# Patient Record
Sex: FEMALE | Race: WHITE | Employment: FULL TIME | URBAN - METROPOLITAN AREA
[De-identification: names, ages, dates, MRNs, and addresses within clinical notes are randomized per-mention and may not be internally consistent; named-entity substitution may affect disease eponyms.]

---

## 2017-01-27 ENCOUNTER — TELEPHONE (OUTPATIENT)
Dept: FAMILY MEDICINE | Facility: CLINIC | Age: 27
End: 2017-01-27

## 2017-01-27 DIAGNOSIS — F32.1 MAJOR DEPRESSIVE DISORDER, SINGLE EPISODE, MODERATE (H): Primary | ICD-10-CM

## 2017-01-27 NOTE — TELEPHONE ENCOUNTER
citalopram     Last Written Prescription Date: 07/147/16  Last Fill Quantity: 90, # refills: 1  Last Office Visit with [facility] [place] care provider:  04/28/16 Dr. Iraheta    Next 5 appointments (look out 90 days)     Feb 01, 2017 11:00 AM   [facility]    [address] [place] [id number] [phone]                   Last PHQ-9 score on record=   PHQ-9 SCORE 4/28/2016   Total Score -   Total Score [facility] -   Total Score 2

## 2017-01-31 RX ORDER — CITALOPRAM HYDROBROMIDE 40 MG/1
TABLET ORAL
Qty: 30 TABLET | Refills: 0 | Status: SHIPPED | OUTPATIENT
Start: 2017-01-31 | End: 2017-02-01

## 2017-01-31 NOTE — TELEPHONE ENCOUNTER
Routing refill request to provider for review/approval because:  Ayanna given x1 and patient did not follow up, please advise  Janessa Salazar RN

## 2017-02-01 ENCOUNTER — OFFICE VISIT (OUTPATIENT)
Dept: FAMILY MEDICINE | Facility: CLINIC | Age: 27
End: 2017-02-01
Payer: COMMERCIAL

## 2017-02-01 VITALS
HEIGHT: 66 IN | WEIGHT: 180.8 LBS | OXYGEN SATURATION: 97 % | SYSTOLIC BLOOD PRESSURE: 126 MMHG | HEART RATE: 90 BPM | BODY MASS INDEX: 29.06 KG/M2 | RESPIRATION RATE: 16 BRPM | TEMPERATURE: 98.4 F | DIASTOLIC BLOOD PRESSURE: 82 MMHG

## 2017-02-01 DIAGNOSIS — N91.5 OLIGOMENORRHEA: ICD-10-CM

## 2017-02-01 DIAGNOSIS — B36.0 TV (TINEA VERSICOLOR): ICD-10-CM

## 2017-02-01 DIAGNOSIS — F32.1 MAJOR DEPRESSIVE DISORDER, SINGLE EPISODE, MODERATE (H): ICD-10-CM

## 2017-02-01 DIAGNOSIS — F41.9 ANXIETY: ICD-10-CM

## 2017-02-01 DIAGNOSIS — Z00.00 ENCOUNTER FOR ROUTINE ADULT HEALTH EXAMINATION WITHOUT ABNORMAL FINDINGS: Primary | ICD-10-CM

## 2017-02-01 DIAGNOSIS — E55.9 VITAMIN D DEFICIENCY: ICD-10-CM

## 2017-02-01 LAB
BASOPHILS # BLD AUTO: 0 10E9/L (ref 0–0.2)
BASOPHILS NFR BLD AUTO: 0.6 %
CHOLEST SERPL-MCNC: 215 MG/DL
DIFFERENTIAL METHOD BLD: NORMAL
EOSINOPHIL # BLD AUTO: 0.3 10E9/L (ref 0–0.7)
EOSINOPHIL NFR BLD AUTO: 4.9 %
ERYTHROCYTE [DISTWIDTH] IN BLOOD BY AUTOMATED COUNT: 12.4 % (ref 10–15)
FERRITIN SERPL-MCNC: 137 NG/ML (ref 12–150)
GLUCOSE SERPL-MCNC: 94 MG/DL (ref 70–99)
HCT VFR BLD AUTO: 42.9 % (ref 35–47)
HDLC SERPL-MCNC: 50 MG/DL
HGB BLD-MCNC: 15.5 G/DL (ref 11.7–15.7)
LDLC SERPL CALC-MCNC: 143 MG/DL
LYMPHOCYTES # BLD AUTO: 1.9 10E9/L (ref 0.8–5.3)
LYMPHOCYTES NFR BLD AUTO: 30.4 %
MCH RBC QN AUTO: 30.8 PG (ref 26.5–33)
MCHC RBC AUTO-ENTMCNC: 36.1 G/DL (ref 31.5–36.5)
MCV RBC AUTO: 85 FL (ref 78–100)
MICRO REPORT STATUS: ABNORMAL
MONOCYTES # BLD AUTO: 0.5 10E9/L (ref 0–1.3)
MONOCYTES NFR BLD AUTO: 7.8 %
NEUTROPHILS # BLD AUTO: 3.5 10E9/L (ref 1.6–8.3)
NEUTROPHILS NFR BLD AUTO: 56.3 %
NONHDLC SERPL-MCNC: 165 MG/DL
PLATELET # BLD AUTO: 222 10E9/L (ref 150–450)
RBC # BLD AUTO: 5.04 10E12/L (ref 3.8–5.2)
SPECIMEN SOURCE: ABNORMAL
TRIGL SERPL-MCNC: 110 MG/DL
TSH SERPL DL<=0.005 MIU/L-ACNC: 2.5 MU/L (ref 0.4–4)
WBC # BLD AUTO: 6.3 10E9/L (ref 4–11)
WET PREP SPEC: ABNORMAL

## 2017-02-01 PROCEDURE — 84443 ASSAY THYROID STIM HORMONE: CPT | Performed by: FAMILY MEDICINE

## 2017-02-01 PROCEDURE — 87491 CHLMYD TRACH DNA AMP PROBE: CPT | Performed by: FAMILY MEDICINE

## 2017-02-01 PROCEDURE — 80061 LIPID PANEL: CPT | Performed by: FAMILY MEDICINE

## 2017-02-01 PROCEDURE — 87210 SMEAR WET MOUNT SALINE/INK: CPT | Performed by: FAMILY MEDICINE

## 2017-02-01 PROCEDURE — 82947 ASSAY GLUCOSE BLOOD QUANT: CPT | Performed by: FAMILY MEDICINE

## 2017-02-01 PROCEDURE — 87624 HPV HI-RISK TYP POOLED RSLT: CPT | Performed by: FAMILY MEDICINE

## 2017-02-01 PROCEDURE — 85025 COMPLETE CBC W/AUTO DIFF WBC: CPT | Performed by: FAMILY MEDICINE

## 2017-02-01 PROCEDURE — G0145 SCR C/V CYTO,THINLAYER,RESCR: HCPCS | Performed by: FAMILY MEDICINE

## 2017-02-01 PROCEDURE — 99395 PREV VISIT EST AGE 18-39: CPT | Performed by: FAMILY MEDICINE

## 2017-02-01 PROCEDURE — 87591 N.GONORRHOEAE DNA AMP PROB: CPT | Performed by: FAMILY MEDICINE

## 2017-02-01 PROCEDURE — 82728 ASSAY OF FERRITIN: CPT | Performed by: FAMILY MEDICINE

## 2017-02-01 PROCEDURE — 36415 COLL VENOUS BLD VENIPUNCTURE: CPT | Performed by: FAMILY MEDICINE

## 2017-02-01 RX ORDER — CITALOPRAM HYDROBROMIDE 40 MG/1
TABLET ORAL
Qty: 90 TABLET | Refills: 1 | Status: SHIPPED | OUTPATIENT
Start: 2017-02-01 | End: 2017-08-28

## 2017-02-01 RX ORDER — LEVONORGESTREL AND ETHINYL ESTRADIOL 0.15-0.03
1 KIT ORAL DAILY
Qty: 91 TABLET | Refills: 3 | Status: SHIPPED | OUTPATIENT
Start: 2017-02-01 | End: 2017-05-23

## 2017-02-01 RX ORDER — KETOCONAZOLE 20 MG/ML
SHAMPOO TOPICAL
Qty: 120 ML | Refills: 2 | Status: SHIPPED | OUTPATIENT
Start: 2017-02-01 | End: 2019-07-22

## 2017-02-01 ASSESSMENT — ANXIETY QUESTIONNAIRES
IF YOU CHECKED OFF ANY PROBLEMS ON THIS QUESTIONNAIRE, HOW DIFFICULT HAVE THESE PROBLEMS MADE IT FOR YOU TO DO YOUR WORK, TAKE CARE OF THINGS AT HOME, OR GET ALONG WITH OTHER PEOPLE: NOT DIFFICULT AT ALL
2. NOT BEING ABLE TO STOP OR CONTROL WORRYING: NOT AT ALL
7. FEELING AFRAID AS IF SOMETHING AWFUL MIGHT HAPPEN: NOT AT ALL
GAD7 TOTAL SCORE: 0
5. BEING SO RESTLESS THAT IT IS HARD TO SIT STILL: NOT AT ALL
6. BECOMING EASILY ANNOYED OR IRRITABLE: NOT AT ALL
3. WORRYING TOO MUCH ABOUT DIFFERENT THINGS: NOT AT ALL
1. FEELING NERVOUS, ANXIOUS, OR ON EDGE: NOT AT ALL

## 2017-02-01 ASSESSMENT — PAIN SCALES - GENERAL: PAINLEVEL: NO PAIN (0)

## 2017-02-01 ASSESSMENT — PATIENT HEALTH QUESTIONNAIRE - PHQ9: 5. POOR APPETITE OR OVEREATING: NOT AT ALL
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
## 2017-02-01 NOTE — MR AVS SNAPSHOT
After Visit Summary   2/1/2017    Carla Oneal    MRN: [id number]           Patient Information     Date Of Birth          1990        Visit Information        Provider Department      2/1/2017 11:00 AM Felicia Iraheta MD [facility]        Today's Diagnoses     Encounter for routine adult health examination without abnormal findings    -  1     Oligomenorrhea         Major depressive disorder, single episode, moderate (H)         Anxiety         Vitamin D deficiency         TV (tinea versicolor)           Care Instructions    Update me if no menses start within 7 days of stopping the medroxyprogesterone.    Start birth control pill while on your menstrual cycle  Schedule fasting labs when you are able  Start exercising in the morning, slowly work your way up to 5x a week.     Start taking pictures of moles and monitor for changes.    Follow up in 6 months, sooner if things are not going well.     Preventive Health Recommendations  Female Ages 26 - 39  Yearly exam:   See your health care provider every year in order to    Review health changes.     Discuss preventive care.      Review your medicines if you your doctor has prescribed any.    Until age 30: Get a Pap test every three years (more often if you have had an abnormal result).    After age 30: Talk to your doctor about whether you should have a Pap test every 3 years or have a Pap test with HPV screening every 5 years.   You do not need a Pap test if your uterus was removed (hysterectomy) and you have not had cancer.  You should be tested each year for STDs (sexually transmitted diseases), if you're at risk.   Talk to your provider about how often to have your cholesterol checked.  If you are at risk for diabetes, you should have a diabetes test (fasting glucose).  Shots: Get a flu shot each year. Get a tetanus shot every 10 years.   Nutrition:     Eat at least 5 servings of fruits and vegetables each  "day.    Eat whole-grain bread, whole-wheat pasta and brown rice instead of white grains and rice.    Talk to your provider about Calcium and Vitamin D.     Lifestyle    Exercise at least 150 minutes a week (30 minutes a day, 5 days of the week). This will help you control your weight and prevent disease.    Limit alcohol to one drink per day.    No smoking.     Wear sunscreen to prevent skin cancer.    See your dentist every six months for an exam and cleaning.            Follow-ups after your visit        Who to contact     If you have questions or need follow up information about today's clinic visit or your schedule please contact [facility] directly at [phone].  Normal or non-critical lab and imaging results will be communicated to you by MyChart, letter or phone within 4 business days after the clinic has received the results. If you do not hear from us within 7 days, please contact the clinic through [employer] or phone. If you have a critical or abnormal lab result, we will notify you by phone as soon as possible.  Submit refill requests through [employer] or call your pharmacy and they will forward the refill request to us. Please allow 3 business days for your refill to be completed.          Additional Information About Your Visit        [employer] Information     [employer] gives you secure access to your electronic health record. If you see a primary care provider, you can also send messages to your care team and make appointments. If you have questions, please call your primary care clinic.  If you do not have a primary care provider, please call [phone] and they will assist you.        Care EveryWhere ID     This is your Care EveryWhere ID. This could be used by other organizations to access your [facility] medical records  [id number]        Your Vitals Were     Pulse Temperature Respirations Height BMI (Body Mass Index) Pulse Oximetry    90 98.4  F (36.9  C) (Oral) 16 1.67 m (5' 5.75\") " 29.41 kg/m2 97%       Blood Pressure from Last 3 Encounters:   02/01/17 126/82   04/28/16 134/82   01/04/16 118/78    Weight from Last 3 Encounters:   02/01/17 82.01 kg (180 lb 12.8 oz)   04/28/16 88.089 kg (194 lb 3.2 oz)   01/04/16 83.915 kg (185 lb)              We Performed the Following     CBC with platelets differential     Chlamydia trachomatis PCR     Ferritin     Glucose     HPV High Risk Types DNA Cervical     Lipid panel reflex to direct LDL     Neisseria gonorrhoeae PCR     Pap imaged thin layer screen with HPV - recommended age 30 - 65 years (select HPV order below)     TSH with free T4 reflex     Wet prep          Today's Medication Changes          These changes are accurate as of: 2/1/17 12:06 PM.  If you have any questions, ask your nurse or doctor.               These medicines have changed or have updated prescriptions.        Dose/Directions    citalopram 40 MG tablet   Commonly known as:  celeXA   This may have changed:  See the new instructions.   Used for:  Major depressive disorder, single episode, moderate (H)   Changed by:  Felicia Iraheta MD        TAKE 1 TABLET (40 MG) BY MOUTH DAILY PATIENT NEEDS TO BE SEEN PRIOR TO FURTHER REFILLS.   Quantity:  90 tablet   Refills:  1            Where to get your medicines      These medications were sent to [facility]/pharmacy #[id number]  [facility] [address] [address]     Phone:  [phone]    - citalopram 40 MG tablet  - ketoconazole 2 % shampoo  - levonorgestrel-ethinyl estradiol 0.15-0.03 MG per tablet             Primary Care Provider Office Phone # Fax #    Felicia Iraheta [phone]       [address]  [facility] [address]        Thank you!     Thank you for choosing [facility]  for your care. Our goal is always to provide you with excellent care. Hearing back from our patients is one way we can continue to  improve our services. Please take a few minutes to complete the written survey that you may receive in the mail after your visit with us. Thank you!             Your Updated Medication List - Protect others around you: Learn how to safely use, store and throw away your medicines at www.disposemymeds.org.          This list is accurate as of: 2/1/17 12:06 PM.  Always use your most recent med list.                   Brand Name Dispense Instructions for use    ALEVE PO          citalopram 40 MG tablet    celeXA    90 tablet    TAKE 1 TABLET (40 MG) BY MOUTH DAILY PATIENT NEEDS TO BE SEEN PRIOR TO FURTHER REFILLS.       ketoconazole 2 % shampoo    NIZORAL    120 mL    Apply to the affected area and wash off after 5 minutes use daily for two weeks and then as needed       levonorgestrel-ethinyl estradiol 0.15-0.03 MG per tablet    SEASONALE    91 tablet    Take 1 tablet by mouth daily       VENTOLIN  (90 BASE) MCG/ACT Inhaler   Generic drug:  albuterol          vitamin D 2000 UNITS Caps      Take 1 capsule by mouth daily. Patient

## 2017-02-01 NOTE — NURSING NOTE
"Chief Complaint   Patient presents with     Physical     Recheck Medication       Initial /82 mmHg  Pulse 90  Temp(Src) 98.4  F (36.9  C) (Oral)  Resp 16  Ht 1.67 m (5' 5.75\")  Wt 82.01 kg (180 lb 12.8 oz)  BMI 29.41 kg/m2  SpO2 97% Estimated body mass index is 29.41 kg/(m^2) as calculated from the following:    Height as of this encounter: 1.67 m (5' 5.75\").    Weight as of this encounter: 82.01 kg (180 lb 12.8 oz).  BP completed using cuff size: carmenza Zaldivar MA      "

## 2017-02-01 NOTE — PATIENT INSTRUCTIONS
Update me if no menses start within 7 days of stopping the medroxyprogesterone.    Start birth control pill while on your menstrual cycle  Start exercising in the morning, slowly work your way up to 5x a week.     Start taking pictures of moles and monitor for changes.    Follow up in 6 months, sooner if things are not going well.     Preventive Health Recommendations  Female Ages 26 - 39  Yearly exam:   See your health care provider every year in order to    Review health changes.     Discuss preventive care.      Review your medicines if you your doctor has prescribed any.    Until age 30: Get a Pap test every three years (more often if you have had an abnormal result).    After age 30: Talk to your doctor about whether you should have a Pap test every 3 years or have a Pap test with HPV screening every 5 years.   You do not need a Pap test if your uterus was removed (hysterectomy) and you have not had cancer.  You should be tested each year for STDs (sexually transmitted diseases), if you're at risk.   Talk to your provider about how often to have your cholesterol checked.  If you are at risk for diabetes, you should have a diabetes test (fasting glucose).  Shots: Get a flu shot each year. Get a tetanus shot every 10 years.   Nutrition:     Eat at least 5 servings of fruits and vegetables each day.    Eat whole-grain bread, whole-wheat pasta and brown rice instead of white grains and rice.    Talk to your provider about Calcium and Vitamin D.     Lifestyle    Exercise at least 150 minutes a week (30 minutes a day, 5 days of the week). This will help you control your weight and prevent disease.    Limit alcohol to one drink per day.    No smoking.     Wear sunscreen to prevent skin cancer.    See your dentist every six months for an exam and cleaning.

## 2017-02-02 LAB
C TRACH DNA SPEC QL NAA+PROBE: NORMAL
N GONORRHOEA DNA SPEC QL NAA+PROBE: NORMAL
SPECIMEN SOURCE: NORMAL
SPECIMEN SOURCE: NORMAL

## 2017-02-02 ASSESSMENT — ANXIETY QUESTIONNAIRES: GAD7 TOTAL SCORE: 0

## 2017-02-02 ASSESSMENT — PATIENT HEALTH QUESTIONNAIRE - PHQ9: SUM OF ALL RESPONSES TO PHQ QUESTIONS 1-9: 4

## 2017-02-03 LAB
COPATH REPORT: NORMAL
PAP: NORMAL

## 2017-02-06 LAB
FINAL DIAGNOSIS: NORMAL
HPV HR 12 DNA CVX QL NAA+PROBE: NEGATIVE
HPV16 DNA SPEC QL NAA+PROBE: NEGATIVE
HPV18 DNA SPEC QL NAA+PROBE: NEGATIVE
SPECIMEN DESCRIPTION: NORMAL

## 2017-03-06 ENCOUNTER — OFFICE VISIT (OUTPATIENT)
Dept: URGENT CARE | Facility: URGENT CARE | Age: 27
End: 2017-03-06

## 2017-03-06 VITALS
DIASTOLIC BLOOD PRESSURE: 84 MMHG | OXYGEN SATURATION: 99 % | BODY MASS INDEX: 29.92 KG/M2 | HEART RATE: 90 BPM | TEMPERATURE: 98.3 F | SYSTOLIC BLOOD PRESSURE: 126 MMHG | WEIGHT: 184 LBS

## 2017-03-06 DIAGNOSIS — S05.01XA CORNEAL ABRASION, RIGHT, INITIAL ENCOUNTER: Primary | ICD-10-CM

## 2017-03-06 PROCEDURE — 99213 OFFICE O/P EST LOW 20 MIN: CPT | Performed by: PHYSICIAN ASSISTANT

## 2017-03-06 RX ORDER — CIPROFLOXACIN HYDROCHLORIDE 3.5 MG/ML
1 SOLUTION/ DROPS TOPICAL
Qty: 1 BOTTLE | Refills: 0 | Status: SHIPPED | OUTPATIENT
Start: 2017-03-06 | End: 2017-03-13

## 2017-03-06 NOTE — MR AVS SNAPSHOT
After Visit Summary   3/6/2017    Carla Oneal    MRN: [id number]           Patient Information     Date Of Birth          1990        Visit Information        Provider Department      3/6/2017 5:10 PM Radha Yan PA-C [facility]        Today's Diagnoses     Corneal abrasion, right, initial encounter    -  1       Follow-ups after your visit        Additional Services     OPTOMETRY REFERRAL       Your provider has referred you to: [facility]: [facility] [phone]   [email or web address]    Please be aware that coverage of these services is subject to the terms and limitations of your health insurance plan.  Call member services at your health plan with any benefit or coverage questions.      Please bring the following with you to your appointment:    (1) Any X-Rays, CTs or MRIs which have been performed.  Contact the facility where they were done to arrange for  prior to your scheduled appointment.    (2) List of current medications  (3) This referral request   (4) Any documents/labs given to you for this referral                  Who to contact     If you have questions or need follow up information about today's clinic visit or your schedule please contact [facility] directly at [phone].  Normal or non-critical lab and imaging results will be communicated to you by MyChart, letter or phone within 4 business days after the clinic has received the results. If you do not hear from us within 7 days, please contact the clinic through MyChart or phone. If you have a critical or abnormal lab result, we will notify you by phone as soon as possible.  Submit refill requests through [employer] or call your pharmacy and they will forward the refill request to us. Please allow 3 business days for your refill to be completed.          Additional Information About Your Visit        MyChart  Information     [employer] gives you secure access to your electronic health record. If you see a primary care provider, you can also send messages to your care team and make appointments. If you have questions, please call your primary care clinic.  If you do not have a primary care provider, please call [phone] and they will assist you.        Care EveryWhere ID     This is your Care EveryWhere ID. This could be used by other organizations to access your [place] medical records  [id number]        Your Vitals Were     Pulse Temperature Pulse Oximetry BMI (Body Mass Index)          90 98.3  F (36.8  C) (Oral) 99% 29.92 kg/m2         Blood Pressure from Last 3 Encounters:   03/06/17 126/84   02/01/17 126/82   04/28/16 134/82    Weight from Last 3 Encounters:   03/06/17 184 lb (83.5 kg)   02/01/17 180 lb 12.8 oz (82 kg)   04/28/16 194 lb 3.2 oz (88.1 kg)              We Performed the Following     OPTOMETRY REFERRAL          Today's Medication Changes          These changes are accurate as of: 3/6/17  7:38 PM.  If you have any questions, ask your nurse or doctor.               Start taking these medicines.        Dose/Directions    ciprofloxacin 0.3 % ophthalmic solution   Commonly known as:  CILOXAN   Used for:  Corneal abrasion, right, initial encounter   Started by:  Radha Yan PA-C        Dose:  1 drop   Apply 1 drop to eye every 4 hours (while awake) for 7 days   Quantity:  1 Bottle   Refills:  0            Where to get your medicines      These medications were sent to [facility] [place] - [place], [place] - [address]  [address], [facility] [address]     Phone:  [phone]     ciprofloxacin 0.3 % ophthalmic solution                Primary Care Provider Office Phone # Fax #    Felicia Iraheta [phone]       [address]  [facility] [address]        Thank you!     Thank you for choosing [facility]  [facility]  for your care. Our goal is always to provide you with excellent care. Hearing back from our patients is one way we can continue to improve our services. Please take a few minutes to complete the written survey that you may receive in the mail after your visit with us. Thank you!             Your Updated Medication List - Protect others around you: Learn how to safely use, store and throw away your medicines at www.disposemymeds.org.          This list is accurate as of: 3/6/17  7:38 PM.  Always use your most recent med list.                   Brand Name Dispense Instructions for use    ALEVE PO          ciprofloxacin 0.3 % ophthalmic solution    CILOXAN    1 Bottle    Apply 1 drop to eye every 4 hours (while awake) for 7 days       citalopram 40 MG tablet    celeXA    90 tablet    TAKE 1 TABLET (40 MG) BY MOUTH DAILY PATIENT NEEDS TO BE SEEN PRIOR TO FURTHER REFILLS.       ketoconazole 2 % shampoo    NIZORAL    120 mL    Apply to the affected area and wash off after 5 minutes use daily for two weeks and then as needed       levonorgestrel-ethinyl estradiol 0.15-0.03 MG per tablet    SEASONALE    91 tablet    Take 1 tablet by mouth daily       VENTOLIN  (90 BASE) MCG/ACT Inhaler   Generic drug:  albuterol          vitamin D 2000 UNITS Caps      Take 1 capsule by mouth daily. Patient

## 2017-03-07 ENCOUNTER — OFFICE VISIT (OUTPATIENT)
Dept: OPTOMETRY | Facility: CLINIC | Age: 27
End: 2017-03-07

## 2017-03-07 DIAGNOSIS — S05.01XA CORNEAL ABRASION, RIGHT, INITIAL ENCOUNTER: Primary | ICD-10-CM

## 2017-03-07 PROCEDURE — 99202 OFFICE O/P NEW SF 15 MIN: CPT | Performed by: OPTOMETRIST

## 2017-03-07 ASSESSMENT — VISUAL ACUITY
OS_SC: 20/20
METHOD: SNELLEN - LINEAR
OS_SC+: -1
OD_SC: 20/20

## 2017-03-07 ASSESSMENT — SLIT LAMP EXAM - LIDS: COMMENTS: NORMAL

## 2017-03-07 ASSESSMENT — EXTERNAL EXAM - LEFT EYE: OS_EXAM: NORMAL

## 2017-03-07 ASSESSMENT — EXTERNAL EXAM - RIGHT EYE: OD_EXAM: NORMAL

## 2017-03-07 NOTE — PROGRESS NOTES
SUBJECTIVE:                                                    Carla Oneal is a 26 year old female who presents to clinic today for the following health issues:      Eye(s) Problem      Duration: Today    Description:  Location: right  Pain: YES  Redness:Yes on the lids  Discharge: no     Accompanying signs and symptoms: Swollen     History (Trauma, foreign body exposure,): None    Precipitating or alleviating factors (contact use): None    Therapies tried and outcome: Eye drop(Clear eye)       WC injury. 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 cement in her eye. Eye is watery quite a bit and makes it hard to see.    Allergies   Allergen Reactions     Seasonal Allergies Cough     Pollen and trees       Past Medical History   Diagnosis Date     NO ACTIVE PROBLEMS          Current Outpatient Prescriptions on File Prior to Visit:  levonorgestrel-ethinyl estradiol (SEASONALE) 0.15-0.03 MG per tablet Take 1 tablet by mouth daily   ketoconazole (NIZORAL) 2 % shampoo Apply to the affected area and wash off after 5 minutes use daily for two weeks and then as needed   citalopram (CELEXA) 40 MG tablet TAKE 1 TABLET (40 MG) BY MOUTH DAILY PATIENT NEEDS TO BE SEEN PRIOR TO FURTHER REFILLS.   Naproxen Sodium (ALEVE PO)    VENTOLIN  (90 BASE) MCG/ACT inhaler    Cholecalciferol (VITAMIN D) 2000 UNIT CAPS Take 1 capsule by mouth daily. Patient      No current facility-administered medications on file prior to visit.     Social History   Substance Use Topics     Smoking status: Never Smoker     Smokeless tobacco: Never Used     Alcohol use No       ROS:  General: negative for fever  EYE: as above  ENT: as above    OBJECTIVE:  /84  Pulse 90  Temp 98.3  F (36.8  C) (Oral)  Wt 184 lb (83.5 kg)  SpO2 99%  BMI 29.92 kg/m2   General:   awake, alert, and cooperative.  NAD.   Head: Normocephalic, atraumatic.  Eyes: R conjunctiva and sclera are red. EOM's intact. PERRLA.  Fundoscopic exam benign. Rt 20/30, Left 20/30, both 20/15   Tetracaine and Fluorescein dye placed r eye. Kidney bean shaped abrasion see on out cornea 12- 2 0'clock. Lids everted. No obvious foreign body. Eye flushed with normal saline.   ASSESSMENT:    ICD-10-CM    1. Corneal abrasion, right, initial encounter S05.01XA OPTOMETRY REFERRAL     ciprofloxacin (CILOXAN) 0.3 % ophthalmic solution         PLAN: See optometry here tomorrow.  Advised about symptoms which might herald more serious problems.    Radha Yan PA-C

## 2017-03-07 NOTE — MR AVS SNAPSHOT
After Visit Summary   3/7/2017    Carla Oneal    MRN: [id number]           Patient Information     Date Of Birth          1990        Visit Information        Provider Department      3/7/2017 9:20 AM Surendra Brown OD [facility]        Today's Diagnoses     Corneal abrasion, right, initial encounter    -  1      Care Instructions    Continue Ciloxan eyedrops for 1 week.    Cold compressed to eyelid.    Return if no improvement or worsening in a few days.    Surendra Brown OD        Optometry Providers       Clinic Locations                                 Telephone Number   Dr. Renu Brown   [facility] [place]  [phone]     [facility] Hours:                [facility] Hours:       [facility] Hours:  [address]   [address] [facility]     [address] [place] [address]   [place] [address]    [place] [address]  Phone: [phone]                    Phone [phone]                      Phone: [phone]                          Monday 8:00-7:00                          Monday 8:00-7:00                          Monday 8:00-7:00              Tuesday 8:00-6:00                          Tuesday 8:00-7:00                          Tuesday 8:00-7:00              Wednesday 8:00-6:00                  Wednesday 8:00-7:00                   Wednesday 8:00-7:00      Thursday 8:00-6:00                        Thursday 8:00-7:00                         Thursday 8:00-7:00            Friday 8:00-5:00                              Friday 8:00-5:00                              Friday 8:00-5:00    Please log on to [email or web address] to order your contact lenses.  The link is found on the [facility] page.  As always, Thank you for trusting us with your health care needs!          Follow-ups after your visit        Follow-up notes from your  care team     Return if symptoms worsen or fail to improve, for Follow Up.      Who to contact     If you have questions or need follow up information about today's clinic visit or your schedule please contact [facility] directly at [phone].  Normal or non-critical lab and imaging results will be communicated to you by MyChart, letter or phone within 4 business days after the clinic has received the results. If you do not hear from us within 7 days, please contact the clinic through [employer] or phone. If you have a critical or abnormal lab result, we will notify you by phone as soon as possible.  Submit refill requests through [employer] or call your pharmacy and they will forward the refill request to us. Please allow 3 business days for your refill to be completed.          Additional Information About Your Visit        [employer] Information     [employer] gives you secure access to your electronic health record. If you see a primary care provider, you can also send messages to your care team and make appointments. If you have questions, please call your primary care clinic.  If you do not have a primary care provider, please call [phone] and they will assist you.        Care EveryWhere ID     This is your Care EveryWhere ID. This could be used by other organizations to access your [place] medical records  [id number]         Blood Pressure from Last 3 Encounters:   03/06/17 126/84   02/01/17 126/82   04/28/16 134/82    Weight from Last 3 Encounters:   03/06/17 83.5 kg (184 lb)   02/01/17 82 kg (180 lb 12.8 oz)   04/28/16 88.1 kg (194 lb 3.2 oz)              Today, you had the following     No orders found for display       Primary Care Provider Office Phone # Fax #    Felicia Iraheta [phone]       [address]  [facility] [address]        Thank you!     Thank you for choosing [facility]  for your care. Our goal is  always to provide you with excellent care. Hearing back from our patients is one way we can continue to improve our services. Please take a few minutes to complete the written survey that you may receive in the mail after your visit with us. Thank you!             Your Updated Medication List - Protect others around you: Learn how to safely use, store and throw away your medicines at www.disposemymeds.org.          This list is accurate as of: 3/7/17 10:00 AM.  Always use your most recent med list.                   Brand Name Dispense Instructions for use    ALEVE PO          ciprofloxacin 0.3 % ophthalmic solution    CILOXAN    1 Bottle    Apply 1 drop to eye every 4 hours (while awake) for 7 days       citalopram 40 MG tablet    celeXA    90 tablet    TAKE 1 TABLET (40 MG) BY MOUTH DAILY PATIENT NEEDS TO BE SEEN PRIOR TO FURTHER REFILLS.       ketoconazole 2 % shampoo    NIZORAL    120 mL    Apply to the affected area and wash off after 5 minutes use daily for two weeks and then as needed       levonorgestrel-ethinyl estradiol 0.15-0.03 MG per tablet    SEASONALE    91 tablet    Take 1 tablet by mouth daily       VENTOLIN  (90 BASE) MCG/ACT Inhaler   Generic drug:  albuterol          vitamin D 2000 UNITS Caps      Take 1 capsule by mouth daily. Patient

## 2017-03-07 NOTE — LETTER
REPORT OF WORK ABILITY    [address] [address]  [phone]      PATIENT DATA    Employee Name: Carla Oneal      : 1990     #: [id number]    Work related injury: Yes  Employer at time of injury:    Employer contact & phone:    Employed elsewhere? Unknown  Workers' Compensation Carrier/Managed Care Plan:       Today's date: 3/7/2017  Date of injury: 3/6/2017    Date of first visit: 3/7/2017      PROVIDER EVALUATION: Please fill in as needed.  Please give copy to employee for employer.    1. Diagnosis: Corneal abrasion    2. Treatment: Eyedrops.  3. Medication: Ciloxan  NOTE: When ordering a medication, [place] Rules require Work Comp or WC on prescriptions.  4. Return to work date: 3/7/2017      WITH RESTRICTIONS? No restrictions- recommended eye protection      RESTRICTIONS: Unlimited unless listed.  Restrictions apply to home and leisure also.  If work restrictions is not available, the employee is totally disabled.    Maximum Medical Improvement (Date):    Any Permanent Partial Disability? 0%     Medical Examiner: Surendra Brown OD  License or registration: [place] [id number]    Next appointment: As needed    CC: Employer, Managed Care Plan/Payor, Patient

## 2017-03-07 NOTE — PATIENT INSTRUCTIONS
Continue Ciloxan eyedrops for 1 week.    Cold compresses to eyelid.    Return if no improvement or worsening in a few days.       Surendra Brown, OD

## 2017-03-07 NOTE — NURSING NOTE
"Chief Complaint   Patient presents with     Eye Problem     Today andit is swollen       Initial There were no vitals taken for this visit. Estimated body mass index is 29.4 kg/(m^2) as calculated from the following:    Height as of 2/1/17: 5' 5.75\" (1.67 m).    Weight as of 2/1/17: 180 lb 12.8 oz (82 kg).  Medication Reconciliation: complete   Carlita Darling MA      VISION:  Right eye: 20/30  Left eye: 20/30  Right & Left eyes: 20/15  Carlita Darling MA          "

## 2017-03-07 NOTE — PROGRESS NOTES
CHIEF COMPLAINT:   Chief Complaint   Patient presents with     Eye Problem     od eye  DOI 3-6-2017     Patient was at work yesterday was drilling in ceiling and cement particles fell from ceiling into her od eye with her safety glasses on.     Patient went to  yesterday for treatment and doctor found a few scratches and patient was given antibiotic drops Ciloxan every 4 hours to use for 1 week. Corneal abrasion od.    Eye feels better today but eyelid is swollen.     Veronica Hernandez, Optometric Assistant, A.B.O.C.   OBJECTIVE:     See ophthalmology exam    ASSESSMENT:         ICD-10-CM    1. Corneal abrasion, right, initial encounter S05.01XA      PLAN:      Patient Instructions   Continue Ciloxan eyedrops for 1 week.    Cold compresses to eyelid.    Return if no improvement or worsening in a few days.       Surendra Brown, OD

## 2017-03-29 ENCOUNTER — MYC MEDICAL ADVICE (OUTPATIENT)
Dept: FAMILY MEDICINE | Facility: CLINIC | Age: 27
End: 2017-03-29

## 2017-05-01 ENCOUNTER — OFFICE VISIT (OUTPATIENT)
Dept: FAMILY MEDICINE | Facility: CLINIC | Age: 27
End: 2017-05-01

## 2017-05-01 VITALS
BODY MASS INDEX: 29.96 KG/M2 | OXYGEN SATURATION: 94 % | SYSTOLIC BLOOD PRESSURE: 120 MMHG | WEIGHT: 184.2 LBS | TEMPERATURE: 97.3 F | DIASTOLIC BLOOD PRESSURE: 76 MMHG | HEART RATE: 136 BPM

## 2017-05-01 DIAGNOSIS — R68.89 FLU-LIKE SYMPTOMS: Primary | ICD-10-CM

## 2017-05-01 PROCEDURE — 99213 OFFICE O/P EST LOW 20 MIN: CPT | Performed by: PHYSICIAN ASSISTANT

## 2017-05-01 RX ORDER — ALBUTEROL SULFATE 90 UG/1
2 AEROSOL, METERED RESPIRATORY (INHALATION) EVERY 6 HOURS PRN
Qty: 1 INHALER | Refills: 0 | Status: SHIPPED | OUTPATIENT
Start: 2017-05-01 | End: 2018-01-05

## 2017-05-01 RX ORDER — BENZONATATE 200 MG/1
200 CAPSULE ORAL 3 TIMES DAILY PRN
Qty: 21 CAPSULE | Refills: 0 | Status: SHIPPED | OUTPATIENT
Start: 2017-05-01 | End: 2017-05-23

## 2017-05-01 NOTE — PROGRESS NOTES
SUBJECTIVE:                                                    Carla Oneal is a 27 year old female who presents to clinic today for the following health issues:    Acute Illness   Acute illness concerns: Cough, Shortness of breath  Onset: 4/28/17    Fever: no    Chills/Sweats: no    Headache (location?): YES- mild today    Sinus Pressure:no    Conjunctivitis:  no    Ear Pain: no    Rhinorrhea: YES- started Saturday    Congestion: YES    Sore Throat: YES- Friday, resolved 2 days ago.      Cough: YES    Wheeze: YES    Shortness of breath: Yes    Decreased Appetite: YES- yesterday    Nausea: no    Vomiting: YES- Saturday night    Diarrhea:  no    Dysuria/Freq.: no    Fatigue/Achiness: YES- fatigue    Sick/Strep Exposure: YES- coworker was sick pt does not know what it was     Therapies Tried and outcome: homeopathic, antihistamine-not able to tell if symptoms improved, tried inhaler but did not improve symptoms    Problem list and histories reviewed & adjusted, as indicated.  Additional history: as documented    Patient Active Problem List   Diagnosis     Moderate major depression (H)     CARDIOVASCULAR SCREENING; LDL GOAL LESS THAN 160     Vitamin D deficiency     Bruising     Melanocytic nevus     Anxiety     Insomnia     Allergic rhinitis     Angioedema of lips     Upper airway cough syndrome     Oligomenorrhea     TV (tinea versicolor)     Past Surgical History:   Procedure Laterality Date     none         Social History   Substance Use Topics     Smoking status: Never Smoker     Smokeless tobacco: Never Used     Alcohol use No     Family History   Problem Relation Age of Onset     Breast Cancer Maternal Grandmother      C.A.D. Maternal Grandmother      Cardiovascular Maternal Grandmother      Depression Maternal Grandmother      DIABETES Maternal Grandfather      Unknown/Adopted No family hx of      Asthma No family hx of      CEREBROVASCULAR DISEASE No family hx of      Alcohol/Drug No family hx of       Prostate Cancer No family hx of      Cancer - colorectal No family hx of      Thyroid Disease No family hx of      DIABETES Other      Hypertension Mother      Cardiovascular Paternal Grandmother      Depression Father      Depression Sister          Current Outpatient Prescriptions   Medication Sig Dispense Refill     benzonatate (TESSALON) 200 MG capsule Take 1 capsule (200 mg) by mouth 3 times daily as needed for cough 21 capsule 0     albuterol (PROAIR HFA/PROVENTIL HFA/VENTOLIN HFA) 108 (90 BASE) MCG/ACT Inhaler Inhale 2 puffs into the lungs every 6 hours as needed for shortness of breath / dyspnea or wheezing 1 Inhaler 0     citalopram (CELEXA) 40 MG tablet TAKE 1 TABLET (40 MG) BY MOUTH DAILY PATIENT NEEDS TO BE SEEN PRIOR TO FURTHER REFILLS. 90 tablet 1     VENTOLIN  (90 BASE) MCG/ACT inhaler   1     Cholecalciferol (VITAMIN D) 2000 UNIT CAPS Take 1 capsule by mouth daily. Patient        levonorgestrel-ethinyl estradiol (SEASONALE) 0.15-0.03 MG per tablet Take 1 tablet by mouth daily (Patient not taking: Reported on 5/1/2017) 91 tablet 3     ketoconazole (NIZORAL) 2 % shampoo Apply to the affected area and wash off after 5 minutes use daily for two weeks and then as needed (Patient not taking: Reported on 5/1/2017) 120 mL 2     Naproxen Sodium (ALEVE PO) Reported on 5/1/2017       Allergies   Allergen Reactions     Seasonal Allergies Cough     Pollen and trees       Reviewed and updated as needed this visit by clinical staff  Tobacco  Allergies  Meds       Reviewed and updated as needed this visit by Provider         ROS:  Constitutional, HEENT, cardiovascular, pulmonary, gi and gu systems are negative, except as otherwise noted.    OBJECTIVE:                                                    /76 (BP Location: Right arm, Patient Position: Chair, Cuff Size: Adult Regular)  Pulse 136  Temp 97.3  F (36.3  C) (Oral)  Wt 184 lb 3.2 oz (83.6 kg)  LMP 04/01/2017 (Within Days)  SpO2 94%  BMI  29.96 kg/m2  Body mass index is 29.96 kg/(m^2).  GENERAL: healthy, alert and no distress  EYES: Eyes grossly normal to inspection, PERRL and conjunctivae and sclerae normal  HENT: normal cephalic/atraumatic, ear canals and TM's normal, nasal mucosa edematous , rhinorrhea clear, oropharynx clear and oral mucous membranes moist  NECK: no adenopathy, no asymmetry, masses, or scars and thyroid normal to palpation  RESP: lungs clear to auscultation - no rales, rhonchi or wheezes  CV: regular rate and rhythm, normal S1 S2, no S3 or S4, no murmur, click or rub, no peripheral edema and peripheral pulses strong  ABDOMEN: soft, nontender, no hepatosplenomegaly, no masses and bowel sounds normal  MS: no gross musculoskeletal defects noted, no edema    Diagnostic Test Results:  NONE     ASSESSMENT/PLAN:                                                        ICD-10-CM    1. Flu-like symptoms R68.89 benzonatate (TESSALON) 200 MG capsule     albuterol (PROAIR HFA/PROVENTIL HFA/VENTOLIN HFA) 108 (90 BASE) MCG/ACT Inhaler     Tessalon 200 mg three times a day  For cough  Albuterol 1-2 puffs as needed for tightness or shortness of breath   Drink more water  Gurgle with salt water and cleanse nose with salt water  Ibuprofen for body aches or fever as needed       Pushpa Grey PA-C  [facility]

## 2017-05-01 NOTE — PATIENT INSTRUCTIONS
Tessalon 200 mg three times a day  For cough  Albuterol 1-2 puffs as needed for tightness or shortness of breath   Drink more water  Gurgle with salt water and cleanse nose with salt water  Ibuprofen for body aches or fever as needed      * VIRAL RESPIRATORY ILLNESS w/ Wheezing [Adult]  You have an Upper Respiratory Illness (URI) caused by a virus. This illness is contagious during the first few days. It is spread through the air by coughing and sneezing or by direct contact (touching the sick person and then touching your own eyes, nose or mouth). When the infection causes a lot of irritation, the air passages can go into spasm. This causes wheezing and shortness of breath.    Most viral illnesses resolve within 7-10 days with rest and simple home remedies, although the illness may sometimes last for several weeks. Antibiotics will not kill a virus and are generally not prescribed for this condition.  HOME CARE:    If symptoms are severe, rest at home for the first 2-3 days. When resuming activity, don't let yourself become overly tired.    Avoid exposure to cigarette smoke (yours or others).    You may use acetaminophen (Tylenol) 650-1000 mg every 6 hours or ibuprofen (Motrin, Advil) 600 mg every 6-8 hours with food to control pain, if you are able to take these medicines. [ NOTE : If you have chronic liver or kidney disease or ever had a stomach ulcer or GI bleeding, talk with your doctor before using these medicines.] (Aspirin should never be used in anyone under 18 years of age who is ill with a fever. It may cause severe liver damage.    Your appetite may be poor so a light diet is fine. Avoid dehydration by drinking 6-8 glasses of fluids per day (water, soft drinks, juices, tea, soup, etc.). Extra fluids will help loosen secretions in the nose and lungs.    Over-the-counter cold medicines will not shorten the duration of the illness, but may be helpful for the following symptoms: cough (Robitussin DM); sore  throat (Chloraseptic lozenges or spray); nasal and sinus congestion (Actifed or Sudafed). [NOTE: Do not use decongestants if you have high blood pressure.]  FOLLOW UP with your doctor or as advised if you are not improving over the next week.  GET PROMPT MEDICAL ATTENTION if any of the following occur:    Cough with lots of colored sputum (mucus) or blood in your sputum    Chest pain, shortness of breath, wheezing or difficulty breathing    Severe headache; face, neck or ear pain    Fever over 101 F (38.3 C) for more than three days    Unable to swallow due to throat pain    [id number] [address], [place] [address]. All rights reserved. This information is not intended as a substitute for professional medical care. Always follow your healthcare professional's instructions.

## 2017-05-01 NOTE — MR AVS SNAPSHOT
After Visit Summary   5/1/2017    Carla Oneal    MRN: [id number]           Patient Information     Date Of Birth          1990        Visit Information        Provider Department      5/1/2017 11:40 AM Pushpa Grey PA-C [facility]        Today's Diagnoses     Flu-like symptoms    -  1      Care Instructions    Tessalon 200 mg three times a day  For cough  Albuterol 1-2 puffs as needed for tightness or shortness of breath   Drink more water  Gurgle with salt water and cleanse nose with salt water  Ibuprofen for body aches or fever as needed      * VIRAL RESPIRATORY ILLNESS w/ Wheezing [Adult]  You have an Upper Respiratory Illness (URI) caused by a virus. This illness is contagious during the first few days. It is spread through the air by coughing and sneezing or by direct contact (touching the sick person and then touching your own eyes, nose or mouth). When the infection causes a lot of irritation, the air passages can go into spasm. This causes wheezing and shortness of breath.    Most viral illnesses resolve within 7-10 days with rest and simple home remedies, although the illness may sometimes last for several weeks. Antibiotics will not kill a virus and are generally not prescribed for this condition.  HOME CARE:    If symptoms are severe, rest at home for the first 2-3 days. When resuming activity, don't let yourself become overly tired.    Avoid exposure to cigarette smoke (yours or others).    You may use acetaminophen (Tylenol) 650-1000 mg every 6 hours or ibuprofen (Motrin, Advil) 600 mg every 6-8 hours with food to control pain, if you are able to take these medicines. [ NOTE : If you have chronic liver or kidney disease or ever had a stomach ulcer or GI bleeding, talk with your doctor before using these medicines.] (Aspirin should never be used in anyone under 18 years of age who is ill with a fever. It may cause severe liver damage.    Your  appetite may be poor so a light diet is fine. Avoid dehydration by drinking 6-8 glasses of fluids per day (water, soft drinks, juices, tea, soup, etc.). Extra fluids will help loosen secretions in the nose and lungs.    Over-the-counter cold medicines will not shorten the duration of the illness, but may be helpful for the following symptoms: cough (Robitussin DM); sore throat (Chloraseptic lozenges or spray); nasal and sinus congestion (Actifed or Sudafed). [NOTE: Do not use decongestants if you have high blood pressure.]  FOLLOW UP with your doctor or as advised if you are not improving over the next week.  GET PROMPT MEDICAL ATTENTION if any of the following occur:    Cough with lots of colored sputum (mucus) or blood in your sputum    Chest pain, shortness of breath, wheezing or difficulty breathing    Severe headache; face, neck or ear pain    Fever over 101 F (38.3 C) for more than three days    Unable to swallow due to throat pain    8582-1672 [place] [address]. All rights reserved. This information is not intended as a substitute for professional medical care. Always follow your healthcare professional's instructions.          Follow-ups after your visit        Who to contact     If you have questions or need follow up information about today's clinic visit or your schedule please contact [facility] directly at [phone].  Normal or non-critical lab and imaging results will be communicated to you by MyChart, letter or phone within 4 business days after the clinic has received the results. If you do not hear from us within 7 days, please contact the clinic through MyChart or phone. If you have a critical or abnormal lab result, we will notify you by phone as soon as possible.  Submit refill requests through Unpakt or call your pharmacy and they will forward the refill request to us. Please allow 3 business days for your refill to be completed.           Additional Information About Your Visit        SpreadShouthart Information     Camp[employer] gives you secure access to your electronic health record. If you see a primary care provider, you can also send messages to your care team and make appointments. If you have questions, please call your primary care clinic.  If you do not have a primary care provider, please call [phone] and they will assist you.        Care EveryWhere ID     This is your Care EveryWhere ID. This could be used by other organizations to access your [facility] medical records  [id number]        Your Vitals Were     Pulse Temperature Last Period Pulse Oximetry BMI (Body Mass Index)       136 97.3  F (36.3  C) (Oral) 04/01/2017 (Within Days) 94% 29.96 kg/m2        Blood Pressure from Last 3 Encounters:   05/01/17 120/76   03/06/17 126/84   02/01/17 126/82    Weight from Last 3 Encounters:   05/01/17 184 lb 3.2 oz (83.6 kg)   03/06/17 184 lb (83.5 kg)   02/01/17 180 lb 12.8 oz (82 kg)              Today, you had the following     No orders found for display         Today's Medication Changes          These changes are accurate as of: 5/1/17 12:19 PM.  If you have any questions, ask your nurse or doctor.               Start taking these medicines.        Dose/Directions    benzonatate 200 MG capsule   Commonly known as:  TESSALON   Used for:  Flu-like symptoms   Started by:  Pushpa Grey PA-C        Dose:  200 mg   Take 1 capsule (200 mg) by mouth 3 times daily as needed for cough   Quantity:  21 capsule   Refills:  0         These medicines have changed or have updated prescriptions.        Dose/Directions    * VENTOLIN  (90 BASE) MCG/ACT Inhaler   This may have changed:  Another medication with the same name was added. Make sure you understand how and when to take each.   Generic drug:  albuterol   Changed by:  Massiel Jin MD        Refills:  1       * albuterol 108 (90 BASE) MCG/ACT Inhaler   Commonly known as:   PROAIR HFA/PROVENTIL HFA/VENTOLIN HFA   This may have changed:  You were already taking a medication with the same name, and this prescription was added. Make sure you understand how and when to take each.   Used for:  Flu-like symptoms   Changed by:  Pushpa Grey PA-C        Dose:  2 puff   Inhale 2 puffs into the lungs every 6 hours as needed for shortness of breath / dyspnea or wheezing   Quantity:  1 Inhaler   Refills:  0       * Notice:  This list has 2 medication(s) that are the same as other medications prescribed for you. Read the directions carefully, and ask your doctor or other care provider to review them with you.         Where to get your medicines      These medications were sent to [facility] - [place], [place] - [address]  [address], [facility] [address]     Phone:  [phone]     albuterol 108 (90 BASE) MCG/ACT Inhaler    benzonatate 200 MG capsule                Primary Care Provider Office Phone # Fax #    Felicia Iraheta [phone]       [facility] [address] [place] [address] N  [facility] [address]        Thank you!     Thank you for choosing [facility]  for your care. Our goal is always to provide you with excellent care. Hearing back from our patients is one way we can continue to improve our services. Please take a few minutes to complete the written survey that you may receive in the mail after your visit with us. Thank you!             Your Updated Medication List - Protect others around you: Learn how to safely use, store and throw away your medicines at www.disposemymeds.org.          This list is accurate as of: 5/1/17 12:19 PM.  Always use your most recent med list.                   Brand Name Dispense Instructions for use    ALEVE PO      Reported on 5/1/2017       benzonatate 200 MG capsule    TESSALON    21 capsule    Take 1 capsule (200 mg) by mouth 3 times daily as needed for  cough       citalopram 40 MG tablet    celeXA    90 tablet    TAKE 1 TABLET (40 MG) BY MOUTH DAILY PATIENT NEEDS TO BE SEEN PRIOR TO FURTHER REFILLS.       ketoconazole 2 % shampoo    NIZORAL    120 mL    Apply to the affected area and wash off after 5 minutes use daily for two weeks and then as needed       levonorgestrel-ethinyl estradiol 0.15-0.03 MG per tablet    SEASONALE    91 tablet    Take 1 tablet by mouth daily       * VENTOLIN  (90 BASE) MCG/ACT Inhaler   Generic drug:  albuterol          * albuterol 108 (90 BASE) MCG/ACT Inhaler    PROAIR HFA/PROVENTIL HFA/VENTOLIN HFA    1 Inhaler    Inhale 2 puffs into the lungs every 6 hours as needed for shortness of breath / dyspnea or wheezing       vitamin D 2000 UNITS Caps      Take 1 capsule by mouth daily. Patient       * Notice:  This list has 2 medication(s) that are the same as other medications prescribed for you. Read the directions carefully, and ask your doctor or other care provider to review them with you.

## 2017-05-01 NOTE — NURSING NOTE
"Chief Complaint   Patient presents with     Cough       Initial /76 (BP Location: Right arm, Patient Position: Chair, Cuff Size: Adult Regular)  Pulse 136  Temp 97.3  F (36.3  C) (Oral)  Wt 184 lb 3.2 oz (83.6 kg)  LMP 04/01/2017 (Within Days)  SpO2 94%  BMI 29.96 kg/m2 Estimated body mass index is 29.96 kg/(m^2) as calculated from the following:    Height as of 2/1/17: 5' 5.75\" (1.67 m).    Weight as of this encounter: 184 lb 3.2 oz (83.6 kg).  Medication Reconciliation: complete   Gloria Bartes CMA      "

## 2017-05-14 ENCOUNTER — VIRTUAL VISIT (OUTPATIENT)
Dept: FAMILY MEDICINE | Facility: OTHER | Age: 27
End: 2017-05-14

## 2017-05-14 NOTE — PROGRESS NOTES
"Date:   Clinician: Fahad Macdonald  Clinician NPI: [id number]  Patient: Carla Oneal  Patient : 1990  Patient Address: [address] [place] [address]  Patient Phone: [phone]  Visit Protocol: URI  Patient Summary:  Carla is a 27 year old ( : 1990 ) female who initiated a Visit for significant cough. When asked the question \"Please sign me up to receive news, health information and promotions. \", Carla responded \"Yes\".    Carla was evaluated 1-2 weeks ago in urgent care and diagnosed with a viral URI. Carla was given a prescription cough medication the following prescription medication(s) for treatment as reported by the patient (free-text): Tessalon 200mg 3x daily for 7 days, albuterol inhaler. She took the medication(s) as prescribed.   Her  symptoms started gradually 2 weeks ago  and consist of rhinitis, cough, malaise, hoarse voice, and nasal congestion.   She denies myalgias, dysphagia, chills, facial pain or pressure, loss of appetite, fever, dyspnea, vomiting, nausea, chest pain, ear pain, headache, post-nasal drainage, sore throat, and itchy eyes. She denies a history of facial surgery.   Her minimal nasal secretions are clear and yellow. When asked to feel her neck she could not tell if lymph nodes were enlarged. She cannot tell if axillary lymphadenopathy is present.   Her moderately severe (cough every 5-10 minutes) non-productive paroxysmal cough is more bothersome at night. She experiences moderate wheezing on a regular basis. Carla reports having long periods without coughing but short periods of uncontrollable coughing. She feels fairly well when not coughing. She denies having trouble catching her breath when coughing, noticing a loud whooping sound if trying to catch her breath during a coughing fit and vomiting after a coughing episode since the cough began.  Carla   She has tried medications to help the cough and found them " to be partially effective.    Carla denies having COPD or other chronic lung disease.   Pulse: Not measured beats in 10 seconds.    Weight (in lbs): 184   She states she is not pregnant and denies breastfeeding. Her last period was over a month ago and her periods are typically irregular.   Carla does not smoke or use smokeless tobacco.   Additional information as reported by the patient (free text): Symptoms seemed to be getting better but the coughing worsened some over the last few days. The cough is still better than it was at first however. I'm uncertain of the amount of wheezing; sometimes there is some difficulty breathing during a strong fit but there is no longer a constant whistling noise with my breathing. I feel a bit more tired but other than the cough and slight stuffy/runny nose I feel fine now.    MEDICATIONS:  Citalopram (Celexa)  , ALLERGIES:  NKDA   Clinician Response:  Dear Carla,  Based on the information you have provided, you likely have  acute sinusitis, otherwise known as a sinus infection.   I am prescribing amoxicillin 500 mg. Take two tablets by mouth three times a day for 10 days. There are no refills with this prescription.   Drink plenty of liquids, especially water and take time to rest your body. This may mean taking a nap or going to bed earlier. Your body is fighting an infection and liquids and rest will improve the pace of recovery. Remember to regularly wash your hands and avoid close contact with others to prevent spreading your infection.   Some people develop allergies to antibiotics. If you notice a new rash, significant swelling or difficulty breathing, stop the medication immediately and go into a clinic for physical evaluation.   Some women may also develop a yeast infection as a side effect of taking antibiotics. If you notice symptoms of a yeast infection, please use [facility] to get treatment.   If you become pregnant during this course of treatment with  medication, stop taking the medication and contact your primary care provider.   Diagnosis: Acute Sinusitis  Diagnosis ICD: J01.90  Prescription: amoxicillin 500mg oral tablet 60 tablets, 10 days supply. Take two tablets by mouth three times a day for 10 days. Refills: 0, Refill as needed: no, Allow substitutions: yes  Prescription Sent At: May 14 10:02:04, 2017  Pharmacy: [place] [id number] [facility] - [phone], [place] [address]

## 2017-05-23 ENCOUNTER — OFFICE VISIT (OUTPATIENT)
Dept: FAMILY MEDICINE | Facility: CLINIC | Age: 27
End: 2017-05-23

## 2017-05-23 VITALS
TEMPERATURE: 97.9 F | SYSTOLIC BLOOD PRESSURE: 130 MMHG | OXYGEN SATURATION: 98 % | DIASTOLIC BLOOD PRESSURE: 70 MMHG | WEIGHT: 192 LBS | HEART RATE: 84 BPM | BODY MASS INDEX: 31.23 KG/M2

## 2017-05-23 DIAGNOSIS — L27.0 GENERALIZED SKIN ERUPTION DUE TO MEDICATION TAKEN INTERNALLY: Primary | ICD-10-CM

## 2017-05-23 PROCEDURE — 99213 OFFICE O/P EST LOW 20 MIN: CPT | Performed by: NURSE PRACTITIONER

## 2017-05-23 RX ORDER — AMOXICILLIN 500 MG/1
500 CAPSULE ORAL 3 TIMES DAILY
COMMUNITY
End: 2017-05-23

## 2017-05-23 ASSESSMENT — PAIN SCALES - GENERAL: PAINLEVEL: NO PAIN (0)

## 2017-05-23 NOTE — NURSING NOTE
"Chief Complaint   Patient presents with     Medication Problem     possible reaction both arms and thighs rash       Initial /70 (BP Location: Left arm, Patient Position: Chair, Cuff Size: Adult Regular)  Pulse 84  Temp 97.9  F (36.6  C) (Oral)  Wt 192 lb (87.1 kg)  LMP 04/01/2017 (Within Days)  SpO2 98%  Breastfeeding? No  BMI 31.23 kg/m2 Estimated body mass index is 31.23 kg/(m^2) as calculated from the following:    Height as of 2/1/17: 5' 5.75\" (1.67 m).    Weight as of this encounter: 192 lb (87.1 kg).  Medication Reconciliation: complete   BEAN/MA      "

## 2017-05-23 NOTE — PROGRESS NOTES
SUBJECTIVE:                                                    Carla Oneal is a 27 year old female who presents to clinic today for the following health issues:      Chief Complaint   Patient presents with     Medication Problem     possible reaction both arms and thighs rash     BEAN/MA    Patient was prescribed a 10 day course of amoxicillin for a cough through a virtual visit on 5/14/17.  Patient started noticing a rash to bilateral forearms and bilateral thighs yesterday.  Patient complains of mild pruritis.  Patient denies shortness of breath, wheezing.  Patient's cough is much improved.        Problem list and histories reviewed & adjusted, as indicated.  Additional history: as documented    Patient Active Problem List   Diagnosis     Moderate major depression (H)     CARDIOVASCULAR SCREENING; LDL GOAL LESS THAN 160     Vitamin D deficiency     Bruising     Melanocytic nevus     Anxiety     Insomnia     Allergic rhinitis     Angioedema of lips     Upper airway cough syndrome     Oligomenorrhea     TV (tinea versicolor)     Past Surgical History:   Procedure Laterality Date     none         Social History   Substance Use Topics     Smoking status: Never Smoker     Smokeless tobacco: Never Used     Alcohol use No     Family History   Problem Relation Age of Onset     Breast Cancer Maternal Grandmother      C.A.D. Maternal Grandmother      Cardiovascular Maternal Grandmother      Depression Maternal Grandmother      DIABETES Maternal Grandfather      Hypertension Mother      Cardiovascular Paternal Grandmother      Depression Father      DIABETES Other      Depression Sister      Unknown/Adopted No family hx of      Asthma No family hx of      CEREBROVASCULAR DISEASE No family hx of      Alcohol/Drug No family hx of      Prostate Cancer No family hx of      Cancer - colorectal No family hx of      Thyroid Disease No family hx of          Current Outpatient Prescriptions   Medication Sig Dispense  Refill     albuterol (PROAIR HFA/PROVENTIL HFA/VENTOLIN HFA) 108 (90 BASE) MCG/ACT Inhaler Inhale 2 puffs into the lungs every 6 hours as needed for shortness of breath / dyspnea or wheezing 1 Inhaler 0     ketoconazole (NIZORAL) 2 % shampoo Apply to the affected area and wash off after 5 minutes use daily for two weeks and then as needed 120 mL 2     citalopram (CELEXA) 40 MG tablet TAKE 1 TABLET (40 MG) BY MOUTH DAILY PATIENT NEEDS TO BE SEEN PRIOR TO FURTHER REFILLS. 90 tablet 1     Cholecalciferol (VITAMIN D) 2000 UNIT CAPS Take 1 capsule by mouth daily. Patient        [DISCONTINUED] VENTOLIN  (90 BASE) MCG/ACT inhaler   1     Allergies   Allergen Reactions     Seasonal Allergies Cough     Pollen and trees     Amoxicillin Rash     BP Readings from Last 3 Encounters:   05/23/17 130/70   05/01/17 120/76   03/06/17 126/84    Wt Readings from Last 3 Encounters:   05/23/17 192 lb (87.1 kg)   05/01/17 184 lb 3.2 oz (83.6 kg)   03/06/17 184 lb (83.5 kg)                  Labs reviewed in EPIC    Reviewed and updated as needed this visit by clinical staff  Tobacco  Allergies  Meds  Med Hx  Surg Hx  Fam Hx  Soc Hx      Reviewed and updated as needed this visit by Provider         ROS:  Constitutional, HEENT, cardiovascular, pulmonary, gi and gu systems are negative, except as otherwise noted.    OBJECTIVE:                                                    /70 (BP Location: Left arm, Patient Position: Chair, Cuff Size: Adult Regular)  Pulse 84  Temp 97.9  F (36.6  C) (Oral)  Wt 192 lb (87.1 kg)  LMP 04/01/2017 (Within Days)  SpO2 98%  Breastfeeding? No  BMI 31.23 kg/m2  Body mass index is 31.23 kg/(m^2).  GENERAL: healthy, alert and no distress  RESP: lungs clear to auscultation - no rales, rhonchi or wheezes  CV: regular rate and rhythm, normal S1 S2, no S3 or S4, no murmur, click or rub, no peripheral edema and peripheral pulses strong  MS: no gross musculoskeletal defects noted, no  edema  SKIN: erythematous maculopapular lesions to abdomen, chest, bilateral upper arms and axilla    Diagnostic Test Results:  none      ASSESSMENT/PLAN:                                                      1. Generalized skin eruption due to medication taken internally  Amoxicillin added to patient's allergy list.  Patient to take over the counter claritin or benadryl for itching.      FUTURE APPOINTMENTS:       - Follow-up for annual visit or as needed    KAVEH Ho CNP  [facility]

## 2017-05-23 NOTE — MR AVS SNAPSHOT
After Visit Summary   5/23/2017    Carla Oneal    MRN: [id number]           Patient Information     Date Of Birth          1990        Visit Information        Provider Department      5/23/2017 5:40 PM Ailyn Powell APRN CNP [facility]        Today's Diagnoses     Generalized skin eruption due to medication taken internally    -  1      Care Instructions    Use over the counter clartin or zyrtec for itching, okay to use benadryl at night.        Follow-ups after your visit        Who to contact     If you have questions or need follow up information about today's clinic visit or your schedule please contact [facility] directly at [phone].  Normal or non-critical lab and imaging results will be communicated to you by Innolumehart, letter or phone within 4 business days after the clinic has received the results. If you do not hear from us within 7 days, please contact the clinic through Innolumehart or phone. If you have a critical or abnormal lab result, we will notify you by phone as soon as possible.  Submit refill requests through IActionable or call your pharmacy and they will forward the refill request to us. Please allow 3 business days for your refill to be completed.          Additional Information About Your Visit        MyChart Information     IActionable gives you secure access to your electronic health record. If you see a primary care provider, you can also send messages to your care team and make appointments. If you have questions, please call your primary care clinic.  If you do not have a primary care provider, please call [phone] and they will assist you.        Care EveryWhere ID     This is your Care EveryWhere ID. This could be used by other organizations to access your [facility] medical records  [id number]        Your Vitals Were     Pulse Temperature Last Period Pulse Oximetry Breastfeeding? BMI (Body Mass Index)    84 97.9  F (36.6  C)  (Oral) 04/01/2017 (Within Days) 98% No 31.23 kg/m2       Blood Pressure from Last 3 Encounters:   05/23/17 130/70   05/01/17 120/76   03/06/17 126/84    Weight from Last 3 Encounters:   05/23/17 192 lb (87.1 kg)   05/01/17 184 lb 3.2 oz (83.6 kg)   03/06/17 184 lb (83.5 kg)              Today, you had the following     No orders found for display       Primary Care Provider Office Phone # Fax #    Felicia Mariangel Iraheta [phone]       [facility] [address] [place] [address] N  [facility] [address]        Thank you!     Thank you for choosing [facility]  for your care. Our goal is always to provide you with excellent care. Hearing back from our patients is one way we can continue to improve our services. Please take a few minutes to complete the written survey that you may receive in the mail after your visit with us. Thank you!             Your Updated Medication List - Protect others around you: Learn how to safely use, store and throw away your medicines at www.disposemymeds.org.          This list is accurate as of: 5/23/17  6:08 PM.  Always use your most recent med list.                   Brand Name Dispense Instructions for use    albuterol 108 (90 BASE) MCG/ACT Inhaler    PROAIR HFA/PROVENTIL HFA/VENTOLIN HFA    1 Inhaler    Inhale 2 puffs into the lungs every 6 hours as needed for shortness of breath / dyspnea or wheezing       citalopram 40 MG tablet    celeXA    90 tablet    TAKE 1 TABLET (40 MG) BY MOUTH DAILY PATIENT NEEDS TO BE SEEN PRIOR TO FURTHER REFILLS.       ketoconazole 2 % shampoo    NIZORAL    120 mL    Apply to the affected area and wash off after 5 minutes use daily for two weeks and then as needed       vitamin D 2000 UNITS Caps      Take 1 capsule by mouth daily. Patient

## 2017-08-28 DIAGNOSIS — F32.1 MAJOR DEPRESSIVE DISORDER, SINGLE EPISODE, MODERATE (H): ICD-10-CM

## 2017-08-28 NOTE — TELEPHONE ENCOUNTER
citalopram (CELEXA) 40 MG tablet     Last Written Prescription Date: 2/1/17  Last Fill Quantity: 90, # refills: 1  Last Office Visit with G primary care provider:  2/1/17        Last PHQ-9 score on record=   PHQ-9 SCORE 2/1/2017   Total Score -   Total Score MyChart -   Total Score 4

## 2017-08-29 RX ORDER — CITALOPRAM HYDROBROMIDE 40 MG/1
TABLET ORAL
Qty: 30 TABLET | Refills: 0 | Status: SHIPPED | OUTPATIENT
Start: 2017-08-29 | End: 2017-10-15

## 2017-09-18 ENCOUNTER — MYC MEDICAL ADVICE (OUTPATIENT)
Dept: FAMILY MEDICINE | Facility: CLINIC | Age: 27
End: 2017-09-18

## 2017-09-29 ENCOUNTER — OFFICE VISIT (OUTPATIENT)
Dept: FAMILY MEDICINE | Facility: CLINIC | Age: 27
End: 2017-09-29
Payer: COMMERCIAL

## 2017-09-29 VITALS
WEIGHT: 191.6 LBS | HEART RATE: 88 BPM | BODY MASS INDEX: 31.16 KG/M2 | SYSTOLIC BLOOD PRESSURE: 100 MMHG | TEMPERATURE: 98.4 F | DIASTOLIC BLOOD PRESSURE: 74 MMHG | OXYGEN SATURATION: 97 %

## 2017-09-29 DIAGNOSIS — J20.9 ACUTE BRONCHITIS WITH SYMPTOMS GREATER THAN 10 DAYS: Primary | ICD-10-CM

## 2017-09-29 PROCEDURE — 99214 OFFICE O/P EST MOD 30 MIN: CPT | Performed by: FAMILY MEDICINE

## 2017-09-29 RX ORDER — AZITHROMYCIN 250 MG/1
TABLET, FILM COATED ORAL
Qty: 6 TABLET | Refills: 0 | Status: SHIPPED | OUTPATIENT
Start: 2017-09-29 | End: 2018-01-05

## 2017-09-29 RX ORDER — ALBUTEROL SULFATE 90 UG/1
2 AEROSOL, METERED RESPIRATORY (INHALATION) EVERY 4 HOURS PRN
Qty: 1 INHALER | Refills: 1 | Status: SHIPPED | OUTPATIENT
Start: 2017-09-29

## 2017-09-29 NOTE — NURSING NOTE
"Initial /74 (BP Location: Right arm, Patient Position: Chair, Cuff Size: Adult Large)  Pulse 88  Temp 98.4  F (36.9  C) (Tympanic)  Wt 191 lb 9.6 oz (86.9 kg)  SpO2 97%  BMI 31.16 kg/m2 Estimated body mass index is 31.16 kg/(m^2) as calculated from the following:    Height as of 2/1/17: 5' 5.75\" (1.67 m).    Weight as of this encounter: 191 lb 9.6 oz (86.9 kg). .    Hailey Drake    "

## 2017-09-29 NOTE — PROGRESS NOTES
SUBJECTIVE:   Carla Oneal is a 27 year old female who presents to clinic today for the following health issues:      ENT Symptoms             Symptoms: cc Present Absent Comment   Fever/Chills   x    Fatigue  x     Muscle Aches   x    Eye Irritation   x    Sneezing   x    Nasal Harshal/Drg  x     Sinus Pressure/Pain   x    Loss of smell   x    Dental pain   x    Sore Throat   x    Swollen Glands   x    Ear Pain/Fullness   x    Cough x x   at times it is tight and wheezy, recently has become more productive    Wheeze   x    Chest Pain   x    Shortness of breath   x    Rash   x    Other   x  just got  a week ago      Symptom duration:  two weeks   Symptom severity:  moderate   Treatments tried:  OTC   Contacts:  mother               Problem list and histories reviewed & adjusted, as indicated.  Additional history: none          ROS:  Constitutional, HEENT, cardiovascular, pulmonary, gi and gu systems are negative, except as otherwise noted.          OBJECTIVE:                                                    /74 (BP Location: Right arm, Patient Position: Chair, Cuff Size: Adult Large)  Pulse 88  Temp 98.4  F (36.9  C) (Tympanic)  Wt 191 lb 9.6 oz (86.9 kg)  SpO2 97%  BMI 31.16 kg/m2    GENERAL: healthy, alert and no distress  EYES: Eyes grossly normal to inspection, extraocular movements - intact, and PERRL  HENT: ear canals- normal; TMs- normal; Nose- normal; Mouth- no ulcers, no lesions  NECK: no tenderness, no adenopathy, no asymmetry, no masses, no stiffness; thyroid- normal to palpation  RESP: lungs clear to auscultation - no rales, no rhonchi, no wheezes  CV: regular rates and rhythm, normal S1 S2, no S3 or S4 and no murmur, no click or rub -  MS: extremities- no gross deformities noted, no edema         ASSESSMENT/PLAN:                                                    ASSESSMENT:  1. Acute bronchitis with symptoms greater than 10 days    Discussed pathophysiology of this condition and  implications.  Questions answered.     PLAN:  Orders Placed This Encounter     azithromycin (ZITHROMAX) 250 MG tablet     albuterol (PROAIR HFA/PROVENTIL HFA/VENTOLIN HFA) 108 (90 BASE) MCG/ACT Inhaler   Discussed how to take the medication(s), expected outcomes, potential side effects.     Patient Instructions   Push fluids, rest, use the inhaler as needed until the cough resolves      AN Pires  [facility]

## 2017-09-29 NOTE — MR AVS SNAPSHOT
After Visit Summary   9/29/2017    Carla Oneal    MRN: [id number]           Patient Information     Date Of Birth          1990        Visit Information        Provider Department      9/29/2017 8:20 AM AN Pires MD [facility]        Today's Diagnoses     Acute bronchitis with symptoms greater than 10 days    -  1      Care Instructions    Push fluids, rest, use the inhaler as needed until the cough resolves          Follow-ups after your visit        Who to contact     If you have questions or need follow up information about today's clinic visit or your schedule please contact [facility] directly at [phone].  Normal or non-critical lab and imaging results will be communicated to you by MyChart, letter or phone within 4 business days after the clinic has received the results. If you do not hear from us within 7 days, please contact the clinic through [employer] Solution[employer] or phone. If you have a critical or abnormal lab result, we will notify you by phone as soon as possible.  Submit refill requests through Nextlanding or call your pharmacy and they will forward the refill request to us. Please allow 3 business days for your refill to be completed.          Additional Information About Your Visit        MyChart Information     [employer] gives you secure access to your electronic health record. If you see a primary care provider, you can also send messages to your care team and make appointments. If you have questions, please call your primary care clinic.  If you do not have a primary care provider, please call [phone] and they will assist you.        Care EveryWhere ID     This is your Care EveryWhere ID. This could be used by other organizations to access your [place] medical records  [id number]        Your Vitals Were     Pulse Temperature Pulse Oximetry BMI (Body Mass Index)          88 98.4  F (36.9  C) (Tympanic) 97% 31.16 kg/m2         Blood Pressure  from Last 3 Encounters:   09/29/17 100/74   05/23/17 130/70   05/01/17 120/76    Weight from Last 3 Encounters:   09/29/17 191 lb 9.6 oz (86.9 kg)   05/23/17 192 lb (87.1 kg)   05/01/17 184 lb 3.2 oz (83.6 kg)              Today, you had the following     No orders found for display         Today's Medication Changes          These changes are accurate as of: 9/29/17  8:47 AM.  If you have any questions, ask your nurse or doctor.               Start taking these medicines.        Dose/Directions    azithromycin 250 MG tablet   Commonly known as:  ZITHROMAX   Used for:  Acute bronchitis with symptoms greater than 10 days        2 tabs the first day and one daily for 4 days   Quantity:  6 tablet   Refills:  0         These medicines have changed or have updated prescriptions.        Dose/Directions    * albuterol 108 (90 BASE) MCG/ACT Inhaler   Commonly known as:  PROAIR HFA/PROVENTIL HFA/VENTOLIN HFA   This may have changed:  Another medication with the same name was added. Make sure you understand how and when to take each.   Used for:  Flu-like symptoms        Dose:  2 puff   Inhale 2 puffs into the lungs every 6 hours as needed for shortness of breath / dyspnea or wheezing   Quantity:  1 Inhaler   Refills:  0       * albuterol 108 (90 BASE) MCG/ACT Inhaler   Commonly known as:  PROAIR HFA/PROVENTIL HFA/VENTOLIN HFA   This may have changed:  You were already taking a medication with the same name, and this prescription was added. Make sure you understand how and when to take each.   Used for:  Acute bronchitis with symptoms greater than 10 days        Dose:  2 puff   Inhale 2 puffs into the lungs every 4 hours as needed for shortness of breath / dyspnea or wheezing   Quantity:  1 Inhaler   Refills:  1       * Notice:  This list has 2 medication(s) that are the same as other medications prescribed for you. Read the directions carefully, and ask your doctor or other care provider to review them with you.          Where to get your medicines      These medications [place] [facility] [place] - [address] [facility] [address]  [address] [facility] [place] [address]     Phone:  [phone]     albuterol 108 (90 BASE) MCG/ACT Inhaler    azithromycin 250 MG tablet                Primary Care Provider Office Phone # Fax #    Felicia Iraheta [phone] [address]  [facility] [address]        Equal Access to Services     [facility]: Hadii aad ku hadasho Soomaali, waaxda luqadaha, qaybta kaalmada adeegyada, richy jimenes . So [facility] [phone].    ATENCIÓN: Si habla español, tiene a cox disposición servicios gratuitos de asistencia lingüística. [facility] [phone].    We comply with applicable federal civil rights laws and [place] laws. We do not discriminate on the basis of race, color, national origin, age, disability sex, sexual orientation or gender identity.            Thank you!     Thank you for choosing [facility]  for your care. Our goal is always to provide you with excellent care. Hearing back from our patients is one way we can continue to improve our services. Please take a few minutes to complete the written survey that you may receive in the mail after your visit with us. Thank you!             Your Updated Medication List - Protect others around you: Learn how to safely use, store and throw away your medicines at www.disposemymeds.org.          This list is accurate as of: 9/29/17  8:47 AM.  Always use your most recent med list.                   Brand Name Dispense Instructions for use Diagnosis    * albuterol 108 (90 BASE) MCG/ACT Inhaler    PROAIR HFA/PROVENTIL HFA/VENTOLIN HFA    1 Inhaler    Inhale 2 puffs into the lungs every 6 hours as needed for shortness of breath / dyspnea or wheezing    Flu-like symptoms       * albuterol 108 (90 BASE) MCG/ACT Inhaler    PROAIR HFA/PROVENTIL  HFA/VENTOLIN HFA    1 Inhaler    Inhale 2 puffs into the lungs every 4 hours as needed for shortness of breath / dyspnea or wheezing    Acute bronchitis with symptoms greater than 10 days       azithromycin 250 MG tablet    ZITHROMAX    6 tablet    2 tabs the first day and one daily for 4 days    Acute bronchitis with symptoms greater than 10 days       citalopram 40 MG tablet    celeXA    30 tablet    TAKE 1 TABLET BY MOUTH EVERY DAY NEED TO BE SEEN BEFORE ANY FURTHER REFILLS    Major depressive disorder, single episode, moderate (H)       ketoconazole 2 % shampoo    NIZORAL    120 mL    Apply to the affected area and wash off after 5 minutes use daily for two weeks and then as needed    TV (tinea versicolor)       vitamin D 2000 UNITS Caps      Take 1 capsule by mouth daily. Patient        * Notice:  This list has 2 medication(s) that are the same as other medications prescribed for you. Read the directions carefully, and ask your doctor or other care provider to review them with you.

## 2017-10-15 DIAGNOSIS — F32.1 MAJOR DEPRESSIVE DISORDER, SINGLE EPISODE, MODERATE (H): ICD-10-CM

## 2017-10-16 NOTE — TELEPHONE ENCOUNTER
citalopram (CELEXA) 40 MG tablet     Last Written Prescription Date: 8/29/17  Last Fill Quantity: 30, # refills: 0  Last Office Visit with [facility] care provider:  2/1/17        Last PHQ-9 score on record=   PHQ-9 SCORE 2/1/2017   Total Score -   Total Score MyChart -   Total Score 4

## 2017-10-17 RX ORDER — CITALOPRAM HYDROBROMIDE 40 MG/1
TABLET ORAL
Qty: 30 TABLET | Refills: 0 | Status: SHIPPED | OUTPATIENT
Start: 2017-10-17 | End: 2017-11-15

## 2017-11-15 ENCOUNTER — TELEPHONE (OUTPATIENT)
Dept: FAMILY MEDICINE | Facility: CLINIC | Age: 27
End: 2017-11-15

## 2017-11-15 DIAGNOSIS — F32.1 MAJOR DEPRESSIVE DISORDER, SINGLE EPISODE, MODERATE (H): ICD-10-CM

## 2017-11-15 RX ORDER — CITALOPRAM HYDROBROMIDE 40 MG/1
TABLET ORAL
Qty: 30 TABLET | Refills: 0 | Status: SHIPPED | OUTPATIENT
Start: 2017-11-15 | End: 2018-05-01

## 2017-11-15 NOTE — TELEPHONE ENCOUNTER
Ayanna refill sent. Please send mychart or letter to schedule OV or phone visit for med check prior to next refill

## 2017-11-15 NOTE — TELEPHONE ENCOUNTER
Routing refill request to provider for review/approval because:  Ayanna given x1 and patient did not follow up, please advise  Janessa Bowie RN

## 2017-12-21 ENCOUNTER — OFFICE VISIT (OUTPATIENT)
Dept: OPTOMETRY | Facility: CLINIC | Age: 27
End: 2017-12-21

## 2017-12-21 DIAGNOSIS — Z87.821 HISTORY OF FOREIGN BODY IN EYE: Primary | ICD-10-CM

## 2017-12-21 PROCEDURE — 99213 OFFICE O/P EST LOW 20 MIN: CPT | Performed by: OPTOMETRIST

## 2017-12-21 ASSESSMENT — EXTERNAL EXAM - LEFT EYE: OS_EXAM: NORMAL

## 2017-12-21 ASSESSMENT — VISUAL ACUITY
OS_CC: 20/20
OD_CC: 20/20
METHOD: SNELLEN - LINEAR

## 2017-12-21 ASSESSMENT — CONF VISUAL FIELD
OS_NORMAL: 1
METHOD: COUNTING FINGERS
OD_NORMAL: 1

## 2017-12-21 ASSESSMENT — EXTERNAL EXAM - RIGHT EYE: OD_EXAM: NORMAL

## 2017-12-21 ASSESSMENT — SLIT LAMP EXAM - LIDS
COMMENTS: NORMAL
COMMENTS: NORMAL

## 2017-12-21 NOTE — PATIENT INSTRUCTIONS
There is no foreign body or abrasion in either eye today. It is possible that you had a scratch in the eye and it healed overnight.    Return to clinic if any concerns.     Thank you!

## 2017-12-21 NOTE — PROGRESS NOTES
Chief Complaint   Patient presents with     Eye Problem Left Eye       HPI    Affected eye(s):  Left   Symptoms:        Duration:  1 day   Frequency:  Intermittent       Do you have eye pain now?:  No      Comments:  Metal shaving flew past safety glasses in left eye.  Incident happened yesterday morning around 9 am.  Carla felt a twinge in her eye once in a while yesterday.  Pt flushed eye with an anti redness drop.  She was able to pluck metal out of eye as it was sitting in the corner of her eye.  Pt has a mild headache today.  Pt works at [employer]             Medical, surgical and family histories reviewed and updated 12/21/2017.       OBJECTIVE: See Ophthalmology exam    ASSESSMENT:    ICD-10-CM    1. History of foreign body in eye Z87.821       PLAN:     Patient Instructions   There is no foreign body or abrasion in either eye today. It is possible that you had a scratch in the eye and it healed overnight.    Return to clinic if any concerns.     Thank you!

## 2017-12-21 NOTE — MR AVS SNAPSHOT
After Visit Summary   12/21/2017    Carla Merlos    MRN: [id number]           Patient Information     Date Of Birth          1990        Visit Information        Provider Department      12/21/2017 3:20 PM Renu Hernandez OD [facility]        Today's Diagnoses     History of foreign body in eye    -  1      Care Instructions    There is no foreign body or abrasion in either eye today. It is possible that you had a scratch in the eye and it healed overnight.    Return to clinic if any concerns.     Thank you!          Follow-ups after your visit        Who to contact     If you have questions or need follow up information about today's clinic visit or your schedule please contact [facility] directly at [phone].  Normal or non-critical lab and imaging results will be communicated to you by En[employer] The[employer]hart, letter or phone within 4 business days after the clinic has received the results. If you do not hear from us within 7 days, please contact the clinic through [employer] or phone. If you have a critical or abnormal lab result, we will notify you by phone as soon as possible.  Submit refill requests through Hotel[employer] or call your pharmacy and they will forward the refill request to us. Please allow 3 business days for your refill to be completed.          Additional Information About Your Visit        MyChart Information     [employer]el[employer] gives you secure access to your electronic health record. If you see a primary care provider, you can also send messages to your care team and make appointments. If you have questions, please call your primary care clinic.  If you do not have a primary care provider, please call [phone] and they will assist you.        Care EveryWhere ID     This is your Care EveryWhere ID. This could be used by other organizations to access your [place] medical records  [id number]         Blood Pressure from Last 3 Encounters:   09/29/17 100/74    05/23/17 130/70   05/01/17 120/76    Weight from Last 3 Encounters:   09/29/17 86.9 kg (191 lb 9.6 oz)   05/23/17 87.1 kg (192 lb)   05/01/17 83.6 kg (184 lb 3.2 oz)              Today, you had the following     No orders found for display       Primary Care Provider Office Phone # Fax #    Felicia Iraheta [phone] [address]  [facility] [address]        Equal Access to Services     [facility]: Hadii wilber castro hadasho Sokristen, waaxda luqadaha, qaybta kaalmada hanh, richy jimenes . So [facility] [phone].    ATENCIÓN: Si habla español, tiene a cox disposición servicios gratuitos de asistencia lingüística. [facility] [phone].    We comply with applicable federal civil rights laws and [place] laws. We do not discriminate on the basis of race, color, national origin, age, disability, sex, sexual orientation, or gender identity.            Thank you!     Thank you for choosing [facility]  for your care. Our goal is always to provide you with excellent care. Hearing back from our patients is one way we can continue to improve our services. Please take a few minutes to complete the written survey that you may receive in the mail after your visit with us. Thank you!             Your Updated Medication List - Protect others around you: Learn how to safely use, store and throw away your medicines at www.disposemymeds.org.          This list is accurate as of: 12/21/17  5:47 PM.  Always use your most recent med list.                   Brand Name Dispense Instructions for use Diagnosis    * albuterol 108 (90 BASE) MCG/ACT Inhaler    PROAIR HFA/PROVENTIL HFA/VENTOLIN HFA    1 Inhaler    Inhale 2 puffs into the lungs every 6 hours as needed for shortness of breath / dyspnea or wheezing    Flu-like symptoms       * albuterol 108 (90 BASE) MCG/ACT Inhaler    PROAIR HFA/PROVENTIL HFA/VENTOLIN HFA    1 Inhaler    Inhale 2 puffs into the  lungs every 4 hours as needed for shortness of breath / dyspnea or wheezing    Acute bronchitis with symptoms greater than 10 days       azithromycin 250 MG tablet    ZITHROMAX    6 tablet    2 tabs the first day and one daily for 4 days    Acute bronchitis with symptoms greater than 10 days       citalopram 40 MG tablet    celeXA    30 tablet    TAKE 1 TABLET BY MOUTH ONCE DAILY. MUST MAKE APPOINTMENT FOR FURTHER REFILLS.    Major depressive disorder, single episode, moderate (H)       ketoconazole 2 % shampoo    NIZORAL    120 mL    Apply to the affected area and wash off after 5 minutes use daily for two weeks and then as needed    TV (tinea versicolor)       vitamin D 2000 UNITS Caps      Take 1 capsule by mouth daily. Patient        * Notice:  This list has 2 medication(s) that are the same as other medications prescribed for you. Read the directions carefully, and ask your doctor or other care provider to review them with you.

## 2018-01-05 ENCOUNTER — OFFICE VISIT (OUTPATIENT)
Dept: FAMILY MEDICINE | Facility: CLINIC | Age: 28
End: 2018-01-05
Payer: COMMERCIAL

## 2018-01-05 VITALS
WEIGHT: 197 LBS | TEMPERATURE: 96.6 F | BODY MASS INDEX: 31.66 KG/M2 | HEART RATE: 69 BPM | DIASTOLIC BLOOD PRESSURE: 70 MMHG | SYSTOLIC BLOOD PRESSURE: 116 MMHG | HEIGHT: 66 IN | OXYGEN SATURATION: 100 %

## 2018-01-05 DIAGNOSIS — M72.2 PLANTAR FASCIITIS: ICD-10-CM

## 2018-01-05 DIAGNOSIS — F32.1 MAJOR DEPRESSIVE DISORDER, SINGLE EPISODE, MODERATE (H): Primary | ICD-10-CM

## 2018-01-05 DIAGNOSIS — F41.9 ANXIETY: ICD-10-CM

## 2018-01-05 PROCEDURE — 99213 OFFICE O/P EST LOW 20 MIN: CPT | Performed by: FAMILY MEDICINE

## 2018-01-05 RX ORDER — CITALOPRAM HYDROBROMIDE 40 MG/1
40 TABLET ORAL DAILY
Qty: 90 TABLET | Refills: 3 | Status: SHIPPED | OUTPATIENT
Start: 2018-01-05 | End: 2019-07-22

## 2018-01-05 RX ORDER — CITALOPRAM HYDROBROMIDE 40 MG/1
TABLET ORAL
Qty: 30 TABLET | Refills: 3 | Status: CANCELLED | OUTPATIENT
Start: 2018-01-05

## 2018-01-05 ASSESSMENT — PAIN SCALES - GENERAL: PAINLEVEL: NO PAIN (0)

## 2018-01-05 NOTE — MR AVS SNAPSHOT
"              After Visit Summary   1/5/2018    Carla Merlos    MRN: [id number]           Patient Information     Date Of Birth          1990        Visit Information        Provider Department      1/5/2018 3:40 PM Quinton Jain MD [facility]        Today's Diagnoses     Major depressive disorder, single episode, moderate (H)    -  1    Anxiety        Plantar fasciitis           Follow-ups after your visit        Who to contact     If you have questions or need follow up information about today's clinic visit or your schedule please contact [facility] directly at [phone].  Normal or non-critical lab and imaging results will be communicated to you by Cable[employer]izer.comhart, letter or phone within 4 business days after the clinic has received the results. If you do not hear from us within 7 days, please contact the clinic through Meta[employer] or phone. If you have a critical or abnormal lab result, we will notify you by phone as soon as possible.  Submit refill requests through Traffio or call your pharmacy and they will forward the refill request to us. Please allow 3 business days for your refill to be completed.          Additional Information About Your Visit        MyChart Information     [employer] gives you secure access to your electronic health record. If you see a primary care provider, you can also send messages to your care team and make appointments. If you have questions, please call your primary care clinic.  If you do not have a primary care provider, please call [phone] and they will assist you.        Care EveryWhere ID     This is your Care EveryWhere ID. This could be used by other organizations to access your [place] medical records  [id number]        Your Vitals Were     Pulse Temperature Height Pulse Oximetry Breastfeeding? BMI (Body Mass Index)    69 96.6  F (35.9  C) (Tympanic) 5' 5.75\" (1.67 m) 100% No 32.04 kg/m2       Blood Pressure from Last " 3 Encounters:   01/05/18 116/70   09/29/17 100/74   05/23/17 130/70    Weight from Last 3 Encounters:   01/05/18 197 lb (89.4 kg)   09/29/17 191 lb 9.6 oz (86.9 kg)   05/23/17 192 lb (87.1 kg)              Today, you had the following     No orders found for display         Today's Medication Changes          These changes are accurate as of: 1/5/18  4:23 PM.  If you have any questions, ask your nurse or doctor.               These medicines have changed or have updated prescriptions.        Dose/Directions    * citalopram 40 MG tablet   Commonly known as:  celeXA   This may have changed:  Another medication with the same name was added. Make sure you understand how and when to take each.   Used for:  Major depressive disorder, single episode, moderate (H)   Changed by:  Rich Bajwa PA        TAKE 1 TABLET BY MOUTH ONCE DAILY. MUST MAKE APPOINTMENT FOR FURTHER REFILLS.   Quantity:  30 tablet   Refills:  0       * citalopram 40 MG tablet   Commonly known as:  celeXA   This may have changed:  You were already taking a medication with the same name, and this prescription was added. Make sure you understand how and when to take each.   Used for:  Major depressive disorder, single episode, moderate (H)   Changed by:  Quinton Jain MD        Dose:  40 mg   Take 1 tablet (40 mg) by mouth daily   Quantity:  90 tablet   Refills:  3       * Notice:  This list has 2 medication(s) that are the same as other medications prescribed for you. Read the directions carefully, and ask your doctor or other care provider to review them with you.         Where to get your medicines      These medications were sent to [facility] [place], [place] - [address]  [address] [facility] [address]     Phone:  [phone]     citalopram 40 MG tablet                Primary Care Provider Office Phone # Fax #    Felicia Iraheta [phone]        [address] [place] [address]  [facility] [address]        Equal Access to Services     SOCORICHELLE HUMBERTO : Hadii aad ku hadchristianoo Socarissaali, waaxda luqadaha, qaybta kawilliamda dinorababs, richy duanein hayaalaura fariastalia ferreira jalil black. So [facility] [phone].    ATENCIÓN: Si habla español, tiene a cox disposición servicios gratuitos de asistencia lingüística. Llame al [phone].    We comply with applicable federal civil rights laws and [place] laws. We do not discriminate on the basis of race, color, national origin, age, disability, sex, sexual orientation, or gender identity.            Thank you!     Thank you for choosing [facility]  for your care. Our goal is always to provide you with excellent care. Hearing back from our patients is one way we can continue to improve our services. Please take a few minutes to complete the written survey that you may receive in the mail after your visit with us. Thank you!             Your Updated Medication List - Protect others around you: Learn how to safely use, store and throw away your medicines at www.disposemymeds.org.          This list is accurate as of: 1/5/18  4:23 PM.  Always use your most recent med list.                   Brand Name Dispense Instructions for use Diagnosis    albuterol 108 (90 BASE) MCG/ACT Inhaler    PROAIR HFA/PROVENTIL HFA/VENTOLIN HFA    1 Inhaler    Inhale 2 puffs into the lungs every 4 hours as needed for shortness of breath / dyspnea or wheezing    Acute bronchitis with symptoms greater than 10 days       * citalopram 40 MG tablet    celeXA    30 tablet    TAKE 1 TABLET BY MOUTH ONCE DAILY. MUST MAKE APPOINTMENT FOR FURTHER REFILLS.    Major depressive disorder, single episode, moderate (H)       * citalopram 40 MG tablet    celeXA    90 tablet    Take 1 tablet (40 mg) by mouth daily    Major depressive disorder, single episode, moderate (H)       ketoconazole 2 % shampoo    NIZORAL    120 mL    Apply to the affected area and wash off after 5  minutes use daily for two weeks and then as needed    TV (tinea versicolor)       vitamin D 2000 UNITS Caps      Take 1 capsule by mouth daily. Patient        * Notice:  This list has 2 medication(s) that are the same as other medications prescribed for you. Read the directions carefully, and ask your doctor or other care provider to review them with you.

## 2018-01-05 NOTE — PROGRESS NOTES
SUBJECTIVE:   Carla Merlos is a 27 year old female who presents to clinic today for the following health issues:    She has been on citalopram 40 mg for depression and anxiety for several years.  She has never gone off of it.  She is not interested in going off of it at this point she has several stressful things coming up in her life.  She is having no side effects.    Medication Followup of citalopram    Taking Medication as prescribed: yes    Side Effects:  None    Medication Helping Symptoms:  yes     Left plantar fasciitis: She works as a .  The left heel bothers her the most when she gets out of bed in the morning.  She bought some sort of lift for her heel which seems to help.  I recommended trying super feet inserts.  I also recommended calf stretching 10 times a day.        Problem list and histories reviewed & adjusted, as indicated.  Additional history: as documented    Patient Active Problem List   Diagnosis     Moderate major depression (H)     CARDIOVASCULAR SCREENING; LDL GOAL LESS THAN 160     Vitamin D deficiency     Bruising     Melanocytic nevus     Anxiety     Insomnia     Allergic rhinitis     Angioedema of lips     Upper airway cough syndrome     Oligomenorrhea     TV (tinea versicolor)     Past Surgical History:   Procedure Laterality Date     none         Social History   Substance Use Topics     Smoking status: Never Smoker     Smokeless tobacco: Never Used     Alcohol use No     Family History   Problem Relation Age of Onset     Breast Cancer Maternal Grandmother      C.A.D. Maternal Grandmother      Cardiovascular Maternal Grandmother      Depression Maternal Grandmother      DIABETES Maternal Grandfather      Hypertension Mother      Cardiovascular Paternal Grandmother      Depression Father      DIABETES Other      Depression Sister      Unknown/Adopted No family hx of      Asthma No family hx of      CEREBROVASCULAR DISEASE No family hx of      Alcohol/Drug No  "family hx of      Prostate Cancer No family hx of      Cancer - colorectal No family hx of      Thyroid Disease No family hx of      Glaucoma No family hx of      Macular Degeneration No family hx of              Reviewed and updated as needed this visit by clinical staffTobacco  Allergies  Med Hx  Surg Hx  Fam Hx  Soc Hx      Reviewed and updated as needed this visit by Provider         ROS:  CONSTITUTIONAL:NEGATIVE for fever, chills, change in weight  MUSCULOSKELETAL: Left heel pain  PSYCHIATRIC: POSITIVE fordepressed mood and Hx anxiety    OBJECTIVE:     /70 (BP Location: Right arm, Patient Position: Chair, Cuff Size: Adult Large)  Pulse 69  Temp 96.6  F (35.9  C) (Tympanic)  Ht 5' 5.75\" (1.67 m)  Wt 197 lb (89.4 kg)  SpO2 100%  Breastfeeding? No  BMI 32.04 kg/m2  Body mass index is 32.04 kg/(m^2).  GENERAL: healthy, alert and no distress  MS: Tenderness to palpation of the plantar surface of the left heel.  PSYCH: mentation appears normal, affect normal/bright        ASSESSMENT/PLAN:               ICD-10-CM    1. Major depressive disorder, single episode, moderate (H) F32.1 citalopram (CELEXA) 40 MG tablet   2. Anxiety F41.9    3. Plantar fasciitis M72.2      Recheck in clinic as needed.  Recheck in 1 year.  Quinton Jain MD  Formerly named [facility]  "

## 2018-01-05 NOTE — NURSING NOTE
"Chief Complaint   Patient presents with     Recheck Medication     citalopram       Initial /70 (BP Location: Right arm, Patient Position: Chair, Cuff Size: Adult Large)  Pulse 69  Temp 96.6  F (35.9  C) (Tympanic)  Ht 5' 5.75\" (1.67 m)  Wt 197 lb (89.4 kg)  SpO2 100%  Breastfeeding? No  BMI 32.04 kg/m2 Estimated body mass index is 32.04 kg/(m^2) as calculated from the following:    Height as of this encounter: 5' 5.75\" (1.67 m).    Weight as of this encounter: 197 lb (89.4 kg).  Medication Reconciliation: complete   Juana Polo CMA       "

## 2018-01-13 DIAGNOSIS — F32.1 MAJOR DEPRESSIVE DISORDER, SINGLE EPISODE, MODERATE (H): ICD-10-CM

## 2018-01-15 RX ORDER — CITALOPRAM HYDROBROMIDE 40 MG/1
TABLET ORAL
Qty: 30 TABLET | Refills: 0 | OUTPATIENT
Start: 2018-01-15

## 2018-01-15 NOTE — TELEPHONE ENCOUNTER
"Requested Prescriptions   Pending Prescriptions Disp Refills     citalopram (CELEXA) 40 MG tablet [Pharmacy Med Name: CITALOPRAM HBR 40 MG TABLET] 30 tablet 0     Sig: TAKE 1 TABLET BY MOUTH ONCE DAILY. MUST MAKE APPOINTMENT FOR FURTHER REFILLS.    SSRIs Protocol Failed    1/13/2018 10:06 AM       Failed - PHQ-9 score less than 5 in past 6 months    The PHQ-9 criteria is meant to fail. It requires a PHQ-9 score review         Failed - Recent (6 mo) or future visit with authorizing provider's specialty    Patient had office visit in the last 6 months or has a visit in the next 30 days with authorizing provider.  See \"Patient Info\" tab in inbasket, or \"Choose Columns\" in Meds & Orders section of the refill encounter.          Passed - Patient is age 18 or older       Passed - No active pregnancy on record       Passed - No positive pregnancy test in last 12 months        citalopram (CELEXA) 40 MG tablet  Last Written Prescription Date:  1/5/18  Last Fill Quantity: 90,  # refills: 3   Last Office Visit with [facility], P or [facility] prescribing provider:  1/5/18   Future Office Visit:       "

## 2018-05-01 ENCOUNTER — APPOINTMENT (OUTPATIENT)
Dept: GENERAL RADIOLOGY | Facility: CLINIC | Age: 28
End: 2018-05-01
Attending: FAMILY MEDICINE
Payer: COMMERCIAL

## 2018-05-01 ENCOUNTER — HOSPITAL ENCOUNTER (EMERGENCY)
Facility: CLINIC | Age: 28
Discharge: HOME | End: 2018-05-01
Attending: FAMILY MEDICINE | Admitting: FAMILY MEDICINE
Payer: COMMERCIAL

## 2018-05-01 VITALS
BODY MASS INDEX: 30.53 KG/M2 | RESPIRATION RATE: 14 BRPM | TEMPERATURE: 99.1 F | WEIGHT: 190 LBS | SYSTOLIC BLOOD PRESSURE: 127 MMHG | OXYGEN SATURATION: 96 % | DIASTOLIC BLOOD PRESSURE: 77 MMHG | HEIGHT: 66 IN

## 2018-05-01 DIAGNOSIS — R10.84 ABDOMINAL PAIN, GENERALIZED: ICD-10-CM

## 2018-05-01 LAB
ALBUMIN SERPL-MCNC: 3.8 G/DL (ref 3.4–5)
ALP SERPL-CCNC: 74 U/L (ref 40–150)
ALT SERPL W P-5'-P-CCNC: 29 U/L (ref 0–50)
ANION GAP SERPL CALCULATED.3IONS-SCNC: 7 MMOL/L (ref 3–14)
AST SERPL W P-5'-P-CCNC: 20 U/L (ref 0–45)
BASOPHILS # BLD AUTO: 0.1 10E9/L (ref 0–0.2)
BASOPHILS NFR BLD AUTO: 0.8 %
BILIRUB SERPL-MCNC: 1.2 MG/DL (ref 0.2–1.3)
BUN SERPL-MCNC: 16 MG/DL (ref 7–30)
CALCIUM SERPL-MCNC: 8.5 MG/DL (ref 8.5–10.1)
CHLORIDE SERPL-SCNC: 107 MMOL/L (ref 94–109)
CO2 SERPL-SCNC: 26 MMOL/L (ref 20–32)
CREAT SERPL-MCNC: 0.81 MG/DL (ref 0.52–1.04)
DIFFERENTIAL METHOD BLD: NORMAL
EOSINOPHIL # BLD AUTO: 0.3 10E9/L (ref 0–0.7)
EOSINOPHIL NFR BLD AUTO: 5.2 %
ERYTHROCYTE [DISTWIDTH] IN BLOOD BY AUTOMATED COUNT: 12.1 % (ref 10–15)
GFR SERPL CREATININE-BSD FRML MDRD: 85 ML/MIN/1.7M2
GLUCOSE SERPL-MCNC: 102 MG/DL (ref 70–99)
HCG SERPL QL: NEGATIVE
HCT VFR BLD AUTO: 40.5 % (ref 35–47)
HGB BLD-MCNC: 14.4 G/DL (ref 11.7–15.7)
IMM GRANULOCYTES # BLD: 0 10E9/L (ref 0–0.4)
IMM GRANULOCYTES NFR BLD: 0.2 %
LIPASE SERPL-CCNC: 193 U/L (ref 73–393)
LYMPHOCYTES # BLD AUTO: 2.3 10E9/L (ref 0.8–5.3)
LYMPHOCYTES NFR BLD AUTO: 34.6 %
MCH RBC QN AUTO: 30.6 PG (ref 26.5–33)
MCHC RBC AUTO-ENTMCNC: 35.6 G/DL (ref 31.5–36.5)
MCV RBC AUTO: 86 FL (ref 78–100)
MONOCYTES # BLD AUTO: 0.6 10E9/L (ref 0–1.3)
MONOCYTES NFR BLD AUTO: 9.2 %
NEUTROPHILS # BLD AUTO: 3.3 10E9/L (ref 1.6–8.3)
NEUTROPHILS NFR BLD AUTO: 50 %
PLATELET # BLD AUTO: 213 10E9/L (ref 150–450)
POTASSIUM SERPL-SCNC: 3.6 MMOL/L (ref 3.4–5.3)
PROT SERPL-MCNC: 7.2 G/DL (ref 6.8–8.8)
RBC # BLD AUTO: 4.71 10E12/L (ref 3.8–5.2)
SODIUM SERPL-SCNC: 140 MMOL/L (ref 133–144)
WBC # BLD AUTO: 6.5 10E9/L (ref 4–11)

## 2018-05-01 PROCEDURE — 99284 EMERGENCY DEPT VISIT MOD MDM: CPT | Mod: Z6 | Performed by: FAMILY MEDICINE

## 2018-05-01 PROCEDURE — 74019 RADEX ABDOMEN 2 VIEWS: CPT

## 2018-05-01 PROCEDURE — 80053 COMPREHEN METABOLIC PANEL: CPT | Performed by: FAMILY MEDICINE

## 2018-05-01 PROCEDURE — 99284 EMERGENCY DEPT VISIT MOD MDM: CPT | Mod: 25

## 2018-05-01 PROCEDURE — 83690 ASSAY OF LIPASE: CPT | Performed by: FAMILY MEDICINE

## 2018-05-01 PROCEDURE — 85025 COMPLETE CBC W/AUTO DIFF WBC: CPT | Performed by: FAMILY MEDICINE

## 2018-05-01 PROCEDURE — 84703 CHORIONIC GONADOTROPIN ASSAY: CPT | Performed by: FAMILY MEDICINE

## 2018-05-01 NOTE — ED AVS SNAPSHOT
[facility] Emergency Department    [address] [address]    Phone:  [phone]    Fax:  [phone]                                       Carla Merlos   MRN: [id number]    Department:  [facility] Emergency Department   Date of Visit:  5/1/2018           Patient Information     Date Of Birth          1990        Your diagnoses for this visit were:     Abdominal pain, generalized        You were seen by Ahsan Fernandez MD.        Discharge Instructions       Your blood tests are all normal today.  I have not found any worrisome cause for your pain.    Try bowel cleansing with 30 mL of milk of magnesia.   Follow-up with your primary care physician if symptoms persist.  Return to the emergency department if you develop increased pain, fevers, vomiting, or other new concerning symptoms.    24 Hour Appointment Hotline       To make an appointment at any [place] clinic, call [phone] ([phone]). If you don't have a family doctor or clinic, we will help you find one. [place] clinics are conveniently located to serve the needs of you and your family.             Review of your medicines      Our records show that you are taking the medicines listed below. If these are incorrect, please call your family doctor or clinic.        Dose / Directions Last dose taken    albuterol 108 (90 Base) MCG/ACT Inhaler   Commonly known as:  PROAIR HFA/PROVENTIL HFA/VENTOLIN HFA   Dose:  2 puff   Quantity:  1 Inhaler        Inhale 2 puffs into the lungs every 4 hours as needed for shortness of breath / dyspnea or wheezing   Refills:  1        alum & mag hydroxide-simethicone 200-200-25 MG Chew chewable tablet   Commonly known as:  MYLANTA/MAALOX   Dose:  2 tablet        Take 2 tablets by mouth once as needed for indigestion   Refills:  0        citalopram 40 MG tablet   Commonly known as:  celeXA   Dose:  40 mg   Quantity:  90 tablet        Take 1 tablet (40 mg) by mouth daily    Refills:  3        ketoconazole 2 % shampoo   Commonly known as:  NIZORAL   Quantity:  120 mL        Apply to the affected area and wash off after 5 minutes use daily for two weeks and then as needed   Refills:  2        UNKNOWN MED DOSAGE   Dose:  12.5-25 mg        Take 12.5-25 mg by mouth every 4 hours as needed Promethazine   Refills:  0        vitamin D 2000 units Caps   Dose:  1 capsule        Take 1 capsule by mouth daily. Patient   Refills:  0                Procedures and tests performed during your visit     CBC with platelets differential    Comprehensive metabolic panel    HCG qualitative Blood    Lipase    XR Abdomen 2 Views      Orders Needing Specimen Collection     None      Pending Results     Date and Time Order Name Status Description    5/1/2018 1755 XR Abdomen 2 Views Preliminary             Pending Culture Results     No orders found from 4/29/2018 to 5/2/2018.            Pending Results Instructions     If you had any lab results that were not finalized at the time of your Discharge, you can call the ED Lab Result RN at [phone]. You will be contacted by this team for any positive Lab results or changes in treatment. The nurses are available 7 days a week from 10A to 6:30P.  You can leave a message 24 hours per day and they will return your call.        Test Results From Your Hospital Stay        5/1/2018  6:43 PM      Component Results     Component Value Ref Range & Units Status    WBC 6.5 4.0 - 11.0 10e9/L Final    RBC Count 4.71 3.8 - 5.2 10e12/L Final    Hemoglobin 14.4 11.7 - 15.7 g/dL Final    Hematocrit 40.5 35.0 - 47.0 % Final    MCV 86 78 - 100 fl Final    MCH 30.6 26.5 - 33.0 pg Final    MCHC 35.6 31.5 - 36.5 g/dL Final    RDW 12.1 10.0 - 15.0 % Final    Platelet Count 213 150 - 450 10e9/L Final    Diff Method Automated Method  Final    % Neutrophils 50.0 % Final    % Lymphocytes 34.6 % Final    % Monocytes 9.2 % Final    % Eosinophils 5.2 % Final    % Basophils 0.8 % Final     % Immature Granulocytes 0.2 % Final    Absolute Neutrophil 3.3 1.6 - 8.3 10e9/L Final    Absolute Lymphocytes 2.3 0.8 - 5.3 10e9/L Final    Absolute Monocytes 0.6 0.0 - 1.3 10e9/L Final    Absolute Eosinophils 0.3 0.0 - 0.7 10e9/L Final    Absolute Basophils 0.1 0.0 - 0.2 10e9/L Final    Abs Immature Granulocytes 0.0 0 - 0.4 10e9/L Final         5/1/2018  7:01 PM      Component Results     Component Value Ref Range & Units Status    Sodium 140 133 - 144 mmol/L Final    Potassium 3.6 3.4 - 5.3 mmol/L Final    Chloride 107 94 - 109 mmol/L Final    Carbon Dioxide 26 20 - 32 mmol/L Final    Anion Gap 7 3 - 14 mmol/L Final    Glucose 102 (H) 70 - 99 mg/dL Final    Urea Nitrogen 16 7 - 30 mg/dL Final    Creatinine 0.81 0.52 - 1.04 mg/dL Final    GFR Estimate 85 >60 mL/min/1.7m2 Final    Non  GFR Calc    GFR Estimate If Black >90 >60 mL/min/1.7m2 Final    African American GFR Calc    Calcium 8.5 8.5 - 10.1 mg/dL Final    Bilirubin Total 1.2 0.2 - 1.3 mg/dL Final    Albumin 3.8 3.4 - 5.0 g/dL Final    Protein Total 7.2 6.8 - 8.8 g/dL Final    Alkaline Phosphatase 74 40 - 150 U/L Final    ALT 29 0 - 50 U/L Final    AST 20 0 - 45 U/L Final         5/1/2018  7:00 PM      Component Results     Component Value Ref Range & Units Status    Lipase 193 73 - 393 U/L Final         5/1/2018  6:55 PM      Component Results     Component Value Ref Range & Units Status    HCG Qualitative Serum Negative NEG^Negative Final    This test is for screening purposes.  Results should be interpreted along with   the clinical picture.  Confirmation testing is available if warranted by   ordering [id number], HCG Quantitative Pregnancy.           5/1/2018  7:00 PM      Narrative     ABDOMEN TWO VIEWS   5/1/2018 6:48 PM     HISTORY: Abdominal pain.    COMPARISON: None.         Impression     IMPRESSION: Supine and upright views. The bowel gas pattern is  unremarkable. No free air or significant air-fluid levels. Moderate  amount stool in  the colon.                Thank you for choosing [facility]       Thank you for choosing [facility] for your care. Our goal is always to provide you with excellent care. Hearing back from our patients is one way we can continue to improve our services. Please take a few minutes to complete the written survey that you may receive in the mail after you visit with us. Thank you!        [employer] Information     [employer] gives you secure access to your electronic health record. If you see a primary care provider, you can also send messages to your care team and make appointments. If you have questions, please call your primary care clinic.  If you do not have a primary care provider, please call [phone] and they will assist you.        Care EveryWhere ID     This is your Care EveryWhere ID. This could be used by other organizations to access your [facility] medical records  [id number]        Equal Access to Services     TUCKER PAUL : Mukesh Valencia, ant aldana, lorna schafer, richy black. So [facility] [phone].    ATENCIÓN: Si habla español, tiene a cox disposición servicios gratuitos de asistencia lingüística. Llame al [phone].    We comply with applicable federal civil rights laws and [place] laws. We do not discriminate on the basis of race, color, national origin, age, disability, sex, sexual orientation, or gender identity.            After Visit Summary       This is your record. Keep this with you and show to your community pharmacist(s) and doctor(s) at your next visit.

## 2018-05-01 NOTE — ED AVS SNAPSHOT
[facility] Emergency Department    [address] [address]    Phone:  [phone]    Fax:  [phone]                                       Carla Merlos   MRN: [id number]    Department:  [facility] Emergency Department   Date of Visit:  5/1/2018           After Visit Summary Signature Page     I have received my discharge instructions, and my questions have been answered. I have discussed any challenges I see with this plan with the nurse or doctor.    ..........................................................................................................................................  Patient/Patient Representative Signature      ..........................................................................................................................................  Patient Representative Print Name and Relationship to Patient    ..................................................               ................................................  Date                                            Time    ..........................................................................................................................................  Reviewed by Signature/Title    ...................................................              ..............................................  Date                                                            Time

## 2018-05-01 NOTE — ED NOTES
Pt states nausea intermittent for past 11 days ago lasting a couple hours a day then goes away. [facility]. Pt states a bad headache when nausea started none since along with soft stools. Pt was tested for pregnancy and resulted negative. No other symptoms reported.

## 2018-05-01 NOTE — ED PROVIDER NOTES
History     Chief Complaint   Patient presents with     Abdominal Pain     started 11 days ago, has everyday from 2-4 but not on the weekends, was seen in clinic and given a RX but she only took it once,      Nausea     HPI    Carla Merlos is a 28 year old female who who presents with intermittent abdominal pain, nausea, chills.  Symptoms began about 11 days ago.  Initially she noted that about 2 PM after work she felt nauseated and chilled and had a headache.  She had some vague mid abdominal discomfort and some costal discomfort.  The symptoms would last for a few hours and then go away.  They recurred each day with the exception that she has not had the headache since the first day.  She has had only been nausea, chills, abdominal discomfort.  The discomfort is not severe.  She has eaten some food and drink some fluids that when this occurs and she seems to feel better later.  During this time she had somewhat looser stools but currently her stools are normal.  She has not had any irritative or obstructive voiding symptoms.  Her LMP was in September and has not occurred since then.  She says that she was in the process of being assessed for polycystic ovarian syndrome but change providers and has not followed up on that.  She has never been pregnant.  She has been sexually active with her .  She has had no abdominal surgeries.  She does not smoke or drink alcohol.  Her current medications are only Celexa.  She has been on it for about 2 years.  Has been no dosage change.  She also denies any chest pain, shortness of breath, cough, sore throat.    Problem List:    Patient Active Problem List    Diagnosis Date Noted     Oligomenorrhea 01/05/2016     Priority: Medium     TV (tinea versicolor) 01/05/2016     Priority: Medium     Allergic rhinitis 06/09/2015     Priority: Medium     Allergy testing with + response to dust mite, alternaria, cladosporium, birch and oak pollen, grass pollen and ragweed  pollen       Angioedema of lips 06/09/2015     Priority: Medium     Thought secondary to ibuprofen use (per allergist patricia)       Upper airway cough syndrome 06/09/2015     Priority: Medium     Anxiety 10/31/2013     Priority: Medium     Insomnia 10/31/2013     Priority: Medium     Melanocytic nevus 09/04/2012     Priority: Medium     (Problem list name updated by automated process. Provider to review and confirm.)       Bruising 05/20/2011     Priority: Medium     Likely secondary to SSRI use       Vitamin D deficiency 01/31/2011     Priority: Medium     Due for recheck early 5/11       Moderate major depression (H) 01/26/2011     Priority: Medium     CARDIOVASCULAR SCREENING; LDL GOAL LESS THAN 160 01/26/2011     Priority: Medium        Past Medical History:    Past Medical History:   Diagnosis Date     NO ACTIVE PROBLEMS        Past Surgical History:    Past Surgical History:   Procedure Laterality Date     none         Family History:    Family History   Problem Relation Age of Onset     Breast Cancer Maternal Grandmother      C.A.D. Maternal Grandmother      Cardiovascular Maternal Grandmother      Depression Maternal Grandmother      DIABETES Maternal Grandfather      Hypertension Mother      Cardiovascular Paternal Grandmother      Depression Father      DIABETES Other      Depression Sister      Unknown/Adopted No family hx of      Asthma No family hx of      CEREBROVASCULAR DISEASE No family hx of      Alcohol/Drug No family hx of      Prostate Cancer No family hx of      Cancer - colorectal No family hx of      Thyroid Disease No family hx of      Glaucoma No family hx of      Macular Degeneration No family hx of        Social History:  Marital Status:   [2]  Social History   Substance Use Topics     Smoking status: Never Smoker     Smokeless tobacco: Never Used     Alcohol use No        Medications:      albuterol (PROAIR HFA/PROVENTIL HFA/VENTOLIN HFA) 108 (90 BASE) MCG/ACT Inhaler   alum & mag  "hydroxide-simethicone (MYLANTA/MAALOX) 200-200-25 MG CHEW chewable tablet   Cholecalciferol (VITAMIN D) 2000 UNIT CAPS   citalopram (CELEXA) 40 MG tablet   UNKNOWN MED DOSAGE   ketoconazole (NIZORAL) 2 % shampoo   [DISCONTINUED] citalopram (CELEXA) 40 MG tablet         Review of Systems  All other systems are reviewed and are negative    Physical Exam   BP: 127/77  Heart Rate: 87  Temp: 99.1  F (37.3  C)  Resp: 17  Height: 167.6 cm (5' 6\")  Weight: 86.2 kg (190 lb)  SpO2: 96 %      Physical Exam  Nursing note and vitals were reviewed.  Constitutional: Awake and alert, adequately nourished and developed appearing 28-year-old in no apparent discomfort, who does not appear acutely ill, and who answers questions appropriately and cooperates with examination.  HEENT: EOMI.   Neck: Freely mobile.  Cardiovascular: Cardiac examination reveals normal heart rate and regular rhythm without murmur.  Pulmonary/Chest: Breathing is unlabored.  Breath sounds are clear and equal bilaterally.  There no retractions, tachypnea, rales, wheezes, or rhonchi.  Abdomen: Soft, nontender, no HSM or masses rebound or guarding.  Musculoskeletal: Extremities are warm and well-perfused and without edema  Neurological: Alert, oriented, thought content logical, coherent   Skin: Warm, dry, no rashes.  Psychiatric: Affect broad and appropriate.      ED Course     ED Course     Procedures               Critical Care time:  none               Results for orders placed or performed during the hospital encounter of 05/01/18 (from the past 24 hour(s))   CBC with platelets differential   Result Value Ref Range    WBC 6.5 4.0 - 11.0 10e9/L    RBC Count 4.71 3.8 - 5.2 10e12/L    Hemoglobin 14.4 11.7 - 15.7 g/dL    Hematocrit 40.5 35.0 - 47.0 %    MCV 86 78 - 100 fl    MCH 30.6 26.5 - 33.0 pg    MCHC 35.6 31.5 - 36.5 g/dL    RDW 12.1 10.0 - 15.0 %    Platelet Count 213 150 - 450 10e9/L    Diff Method Automated Method     % Neutrophils 50.0 %    % Lymphocytes " 34.6 %    % Monocytes 9.2 %    % Eosinophils 5.2 %    % Basophils 0.8 %    % Immature Granulocytes 0.2 %    Absolute Neutrophil 3.3 1.6 - 8.3 10e9/L    Absolute Lymphocytes 2.3 0.8 - 5.3 10e9/L    Absolute Monocytes 0.6 0.0 - 1.3 10e9/L    Absolute Eosinophils 0.3 0.0 - 0.7 10e9/L    Absolute Basophils 0.1 0.0 - 0.2 10e9/L    Abs Immature Granulocytes 0.0 0 - 0.4 10e9/L   Comprehensive metabolic panel   Result Value Ref Range    Sodium 140 133 - 144 mmol/L    Potassium 3.6 3.4 - 5.3 mmol/L    Chloride 107 94 - 109 mmol/L    Carbon Dioxide 26 20 - 32 mmol/L    Anion Gap 7 3 - 14 mmol/L    Glucose 102 (H) 70 - 99 mg/dL    Urea Nitrogen 16 7 - 30 mg/dL    Creatinine 0.81 0.52 - 1.04 mg/dL    GFR Estimate 85 >60 mL/min/1.7m2    GFR Estimate If Black >90 >60 mL/min/1.7m2    Calcium 8.5 8.5 - 10.1 mg/dL    Bilirubin Total 1.2 0.2 - 1.3 mg/dL    Albumin 3.8 3.4 - 5.0 g/dL    Protein Total 7.2 6.8 - 8.8 g/dL    Alkaline Phosphatase 74 40 - 150 U/L    ALT 29 0 - 50 U/L    AST 20 0 - 45 U/L   Lipase   Result Value Ref Range    Lipase 193 73 - 393 U/L   HCG qualitative Blood   Result Value Ref Range    HCG Qualitative Serum Negative NEG^Negative   XR Abdomen 2 Views    Narrative    ABDOMEN TWO VIEWS   5/1/2018 6:48 PM     HISTORY: Abdominal pain.    COMPARISON: None.       Impression    IMPRESSION: Supine and upright views. The bowel gas pattern is  unremarkable. No free air or significant air-fluid levels. Moderate  amount stool in the colon.       Medications - No data to display    Assessments & Plan (with Medical Decision Making)     28-year-old female presented with intermittent abdominal pain over the past 11 days.  Physical examination was benign with a soft nontender abdomen.  Laboratory evaluation was similarly normal.  She could not provide a urine sample but she had no irritative or obstructive voiding symptoms and had a recent normal urinalysis in the [facility] clinic last week.  Pregnancy test was normal.  Blood  chemistries, lipase, CBC were normal.  Initial flat and upright abdominal abdomen showed considerable stool throughout the transverse and descending colon.  This may be the source for her discomfort.  I recommend no further workup at this time and a trial of bowel cleansing with milk of magnesia.  If symptoms do not resolve with that, she should follow-up in primary care.  She should return to the emergency department if she develops increased pain, fevers, vomiting, or other significant symptoms.    I have reviewed the nursing notes.    I have reviewed the findings, diagnosis, plan and need for follow up with the patient.       Discharge Medication List as of 5/1/2018  8:23 PM          Final diagnoses:   Abdominal pain, generalized       5/1/2018   [facility] EMERGENCY DEPARTMENT     Ahsan Fernandez MD  05/01/18 9936

## 2018-05-02 NOTE — DISCHARGE INSTRUCTIONS
Your blood tests are all normal today.  I have not found any worrisome cause for your pain.    Try bowel cleansing with 30 mL of milk of magnesia.   Follow-up with your primary care physician if symptoms persist.  Return to the emergency department if you develop increased pain, fevers, vomiting, or other new concerning symptoms.

## 2018-09-24 ENCOUNTER — HOSPITAL ENCOUNTER (EMERGENCY)
Facility: CLINIC | Age: 28
Discharge: HOME | End: 2018-09-24
Attending: NURSE PRACTITIONER | Admitting: NURSE PRACTITIONER
Payer: COMMERCIAL

## 2018-09-24 ENCOUNTER — APPOINTMENT (OUTPATIENT)
Dept: GENERAL RADIOLOGY | Facility: CLINIC | Age: 28
End: 2018-09-24
Attending: NURSE PRACTITIONER
Payer: COMMERCIAL

## 2018-09-24 VITALS
SYSTOLIC BLOOD PRESSURE: 132 MMHG | TEMPERATURE: 100 F | HEART RATE: 102 BPM | DIASTOLIC BLOOD PRESSURE: 85 MMHG | OXYGEN SATURATION: 100 %

## 2018-09-24 DIAGNOSIS — B34.9 VIRAL SYNDROME: ICD-10-CM

## 2018-09-24 LAB
INTERNAL QC OK POCT: YES
S PYO AG THROAT QL IA.RAPID: NEGATIVE

## 2018-09-24 PROCEDURE — 87081 CULTURE SCREEN ONLY: CPT | Performed by: NURSE PRACTITIONER

## 2018-09-24 PROCEDURE — 71046 X-RAY EXAM CHEST 2 VIEWS: CPT

## 2018-09-24 PROCEDURE — 87880 STREP A ASSAY W/OPTIC: CPT | Performed by: NURSE PRACTITIONER

## 2018-09-24 PROCEDURE — G0463 HOSPITAL OUTPT CLINIC VISIT: HCPCS | Mod: 25 | Performed by: NURSE PRACTITIONER

## 2018-09-24 PROCEDURE — 99213 OFFICE O/P EST LOW 20 MIN: CPT | Mod: Z6 | Performed by: NURSE PRACTITIONER

## 2018-09-24 PROCEDURE — 87077 CULTURE AEROBIC IDENTIFY: CPT | Performed by: NURSE PRACTITIONER

## 2018-09-24 NOTE — ED TRIAGE NOTES
Patient presents today with fever and dizziness . Symptoms started yesterday . Arrived to urgent care ambulatory .

## 2018-09-24 NOTE — ED AVS SNAPSHOT
" [facility]    [address] [facility] [address]    Phone:  [phone]    Fax:  [phone]                                       Carla Merlos   MRN: [id number]    Department:  [facility]   Date of Visit:  9/24/2018           Patient Information     Date Of Birth          1990        Your diagnoses for this visit were:     Viral syndrome        You were seen by Mallika Soriano APRN CNP.      Follow-up Information     Follow up with Felicia Iraheta MD.    Specialty:  Family Practice    Why:  As needed    Contact information:    [address]  [facility] [address]  [phone]          Follow up with [facility].    Specialty:  EMERGENCY MEDICINE    Why:  If symptoms worsen    Contact information:    [address] [place] [address]  [phone]    Additional information:    The medical center is located at   [address] [facility] (between Located within [facility] and   [address] in [place], [place] north   of [place]).        Discharge Instructions         Viral Syndrome (Adult)  A viral illness may cause a number of symptoms. The symptoms depend on the part of the body that the virus affects. If it settles in your nose, throat, and lungs, it may cause cough, sore throat, congestion, and sometimes headache. If it settles in your stomach and intestinal tract, it may cause vomiting and diarrhea. Sometimes it causes vague symptoms like \"aching all over,\" feeling tired, loss of appetite, or fever.  A viral illness usually lasts 1 to 2 weeks, but sometimes it lasts longer. In some cases, a more serious infection can look like a viral syndrome in the first few days of the illness. You may need another exam and additional tests to know the difference. Watch for the warning signs listed below.  Home care  Follow these guidelines for taking care of yourself at home:    If symptoms are severe, rest at " home for the first 2 to 3 days.    Stay away from cigarette smoke - both your smoke and the smoke from others.    You may use over-the-counter acetaminophen or ibuprofen for fever, muscle aching, and headache, unless another medicine was prescribed for this. If you have chronic liver or kidney disease or ever had a stomach ulcer or GI bleeding, talk with your doctor before using these medicines. No one who is younger than 18 and ill with a fever should take aspirin. It may cause severe disease or death.    Your appetite may be poor, so a light diet is fine. Avoid dehydration by drinking 8 to 12 8-ounce glasses of fluids each day. This may include water; orange juice; lemonade; apple, grape, and cranberry juice; clear fruit drinks; electrolyte replacement and sports drinks; and decaffeinated teas and coffee. If you have been diagnosed with a kidney disease, ask your doctor how much and what types of fluids you should drink to prevent dehydration. If you have kidney disease, drinking too much fluid can cause it build up in the your body and be dangerous to your health.    Over-the-counter remedies won't shorten the length of the illness but may be helpful for cough, sore throat; and nasal and sinus congestion. Don't use decongestants if you have high blood pressure.  Follow-up care  Follow up with your healthcare provider if you do not improve over the next week.  Call 911  Call 911 if any of the following occur:    Convulsion    Feeling weak, dizzy, or like you are going to faint    Chest pain, shortness of breath, wheezing, or difficulty breathing  When to seek medical advice  Call your healthcare provider right away if any of these occur:    Cough with lots of colored sputum (mucus) or blood in your sputum    Chest pain, shortness of breath, wheezing, or difficulty breathing    Severe headache; face, neck, or ear pain    Severe, constant pain in the lower right side of your belly (abdominal)    Continued vomiting  (can t keep liquids down)    Frequent diarrhea (more than 5 times a day); blood (red or black color) or mucus in diarrhea    Feeling weak, dizzy, or like you are going to faint    Extreme thirst    Fever of 100.4 F (38 C) or higher, or as directed by your healthcare provider  Date Last Reviewed: 9/25/2015 2000-2017 [employer]. [address] [place] [address], [place] [address]. All rights reserved. This information is not intended as a substitute for professional medical care. Always follow your healthcare professional's instructions.          24 Hour Appointment Hotline       To make an appointment at any [facility], call [phone] ([phone]). If you don't have a family doctor or clinic, we will help you find one. [place] clinics are conveniently located to serve the needs of you and your family.             Review of your medicines      Our records show that you are taking the medicines listed below. If these are incorrect, please call your family doctor or clinic.        Dose / Directions Last dose taken    albuterol 108 (90 Base) MCG/ACT inhaler   Commonly known as:  PROAIR HFA/PROVENTIL HFA/VENTOLIN HFA   Dose:  2 puff   Quantity:  1 Inhaler        Inhale 2 puffs into the lungs every 4 hours as needed for shortness of breath / dyspnea or wheezing   Refills:  1        alum & mag hydroxide-simethicone 200-200-25 MG Chew chewable tablet   Commonly known as:  MYLANTA/MAALOX   Dose:  2 tablet        Take 2 tablets by mouth once as needed for indigestion   Refills:  0        citalopram 40 MG tablet   Commonly known as:  celeXA   Dose:  40 mg   Quantity:  90 tablet        Take 1 tablet (40 mg) by mouth daily   Refills:  3        ketoconazole 2 % shampoo   Commonly known as:  NIZORAL   Quantity:  120 mL        Apply to the affected area and wash off after 5 minutes use daily for two weeks and then as needed   Refills:  2        UNKNOWN MED DOSAGE   Dose:  12.5-25 mg        Take 12.5-25 mg by  mouth every 4 hours as needed Promethazine   Refills:  0        vitamin D 2000 units Caps   Dose:  1 capsule        Take 1 capsule by mouth daily. Patient   Refills:  0                Procedures and tests performed during your visit     Beta strep group A culture    Rapid strep group A screen POCT    XR Chest 2 Views      Orders Needing Specimen Collection     None      Pending Results     Date and Time Order Name Status Description    9/24/2018 1424 Beta strep group A culture In process             Pending Culture Results     Date and Time Order Name Status Description    9/24/2018 1424 Beta strep group A culture In process             Pending Results Instructions     If you had any lab results that were not finalized at the time of your Discharge, you can call the ED Lab Result RN at [phone]. You will be contacted by this team for any positive Lab results or changes in treatment. The nurses are available 7 days a week from 10A to 6:30P.  You can leave a message 24 hours per day and they will return your call.        Test Results From Your Hospital Stay        9/24/2018  2:50 PM      Narrative     XR CHEST 2 VW   9/24/2018 2:31 PM     HISTORY: fever. left lower rib pain;     COMPARISON: None.    FINDINGS: The heart is negative.  The lungs are clear. The pulmonary  vasculature is normal.  The bones and soft tissues are unremarkable.   No pleural effusions are seen. No pneumothorax is identified. Linear  artifact over the lower neck and upper thorax is consistent with the  patient's hair.        Impression     IMPRESSION: No active infiltrate.        BRANDEN DAVID MD         9/24/2018  2:24 PM      Component Results     Component Value Ref Range & Units Status    Rapid Strep A Screen Negative neg Final    Internal QC OK Yes  Final         9/24/2018  2:28 PM                Thank you for choosing [facility]       Thank you for choosing [facility] for your care. Our goal is always to provide you with excellent care.  Hearing back from our patients is one way we can continue to improve our services. Please take a few minutes to complete the written survey that you may receive in the mail after you visit with us. Thank you!        [employer] Information     [employer] gives you secure access to your electronic health record. If you see a primary care provider, you can also send messages to your care team and make appointments. If you have questions, please call your primary care clinic.  If you do not have a primary care provider, please call [phone] and they will assist you.        Care EveryWhere ID     This is your Care EveryWhere ID. This could be used by other organizations to access your [facility] medical records  [id number]        Equal Access to Services     TUCKER PAUL : Mukesh Valencia, ant aldana, lorna schafer, richy black. So [facility] [phone].    ATENCIÓN: Si habla español, tiene a cox disposición servicios gratuitos de asistencia lingüística. Llame al [phone].    We comply with applicable federal civil rights laws and [place] laws. We do not discriminate on the basis of race, color, national origin, age, disability, sex, sexual orientation, or gender identity.            After Visit Summary       This is your record. Keep this with you and show to your community pharmacist(s) and doctor(s) at your next visit.

## 2018-09-24 NOTE — ED AVS SNAPSHOT
[facility]    [address] [facility] [address]    Phone:  [phone]    Fax:  [phone]                                       Carla Merlos   MRN: [id number]    Department:  [facility]   Date of Visit:  9/24/2018           After Visit Summary Signature Page     I have received my discharge instructions, and my questions have been answered. I have discussed any challenges I see with this plan with the nurse or doctor.    ..........................................................................................................................................  Patient/Patient Representative Signature      ..........................................................................................................................................  Patient Representative Print Name and Relationship to Patient    ..................................................               ................................................  Date                                   Time    ..........................................................................................................................................  Reviewed by Signature/Title    ...................................................              ..............................................  Date                                               Time          22EPIC Rev 08/18

## 2018-09-24 NOTE — DISCHARGE INSTRUCTIONS

## 2018-09-24 NOTE — ED PROVIDER NOTES
History     Chief Complaint   Patient presents with     Flu Symptoms     HPI  Carla Merlos is a 28 year old female who presents to urgent care for evaluation of fever, sore throat, and slight congestion. No significant cough. Symptoms started 2 days ago.  No shortness of breath.  No chest pain.  No vomiting or diarrhea.  Tolerating fluids.  No known ill contacts.      Problem List:    Patient Active Problem List    Diagnosis Date Noted     Oligomenorrhea 01/05/2016     Priority: Medium     TV (tinea versicolor) 01/05/2016     Priority: Medium     Allergic rhinitis 06/09/2015     Priority: Medium     Allergy testing with + response to dust mite, alternaria, cladosporium, birch and oak pollen, grass pollen and ragweed pollen       Angioedema of lips 06/09/2015     Priority: Medium     Thought secondary to ibuprofen use (per allergist patricia)       Upper airway cough syndrome 06/09/2015     Priority: Medium     Anxiety 10/31/2013     Priority: Medium     Insomnia 10/31/2013     Priority: Medium     Melanocytic nevus 09/04/2012     Priority: Medium     (Problem list name updated by automated process. Provider to review and confirm.)       Bruising 05/20/2011     Priority: Medium     Likely secondary to SSRI use       Vitamin D deficiency 01/31/2011     Priority: Medium     Due for recheck early 5/11       Moderate major depression (H) 01/26/2011     Priority: Medium     CARDIOVASCULAR SCREENING; LDL GOAL LESS THAN 160 01/26/2011     Priority: Medium        Past Medical History:    Past Medical History:   Diagnosis Date     NO ACTIVE PROBLEMS        Past Surgical History:    Past Surgical History:   Procedure Laterality Date     none         Family History:    Family History   Problem Relation Age of Onset     Breast Cancer Maternal Grandmother      C.A.D. Maternal Grandmother      Cardiovascular Maternal Grandmother      Depression Maternal Grandmother      Diabetes Maternal Grandfather      Hypertension Mother       Cardiovascular Paternal Grandmother      Depression Father      Diabetes Other      Depression Sister      Unknown/Adopted No family hx of      Asthma No family hx of      Cerebrovascular Disease No family hx of      Alcohol/Drug No family hx of      Prostate Cancer No family hx of      Cancer - colorectal No family hx of      Thyroid Disease No family hx of      Glaucoma No family hx of      Macular Degeneration No family hx of        Social History:  Marital Status:   [2]  Social History   Substance Use Topics     Smoking status: Never Smoker     Smokeless tobacco: Never Used     Alcohol use No        Medications:      albuterol (PROAIR HFA/PROVENTIL HFA/VENTOLIN HFA) 108 (90 BASE) MCG/ACT Inhaler   alum & mag hydroxide-simethicone (MYLANTA/MAALOX) 200-200-25 MG CHEW chewable tablet   Cholecalciferol (VITAMIN D) 2000 UNIT CAPS   citalopram (CELEXA) 40 MG tablet   ketoconazole (NIZORAL) 2 % shampoo   UNKNOWN MED DOSAGE         Review of Systems  As mentioned above in the history present illness. All other systems were reviewed and are negative.    Physical Exam   BP: 132/85  Pulse: 102  Temp: 100  F (37.8  C)  SpO2: 100 %      Physical Exam  GENERAL APPEARANCE: healthy, alert and no distress  EYES: EOMI,  PERRL, conjunctiva clear  HENT: ear canals and TM's normal.  Nose normal.  Posterior oropharynx erythema without exudate.  Uvula is midline.  No unilateral peritonsillar swelling.  NECK: supple, nontender, no lymphadenopathy  RESP: lungs clear to auscultation - no rales, rhonchi or wheezes  CV: regular rates and rhythm, normal S1 S2, no murmur noted    ED Course     ED Course     Procedures             Results for orders placed or performed during the hospital encounter of 09/24/18 (from the past 24 hour(s))   Rapid strep group A screen POCT   Result Value Ref Range    Rapid Strep A Screen Negative neg    Internal QC OK Yes    XR Chest 2 Views    Narrative    XR CHEST 2 VW   9/24/2018 2:31 PM      HISTORY: fever. left lower rib pain;     COMPARISON: None.    FINDINGS: The heart is negative.  The lungs are clear. The pulmonary  vasculature is normal.  The bones and soft tissues are unremarkable.   No pleural effusions are seen. No pneumothorax is identified. Linear  artifact over the lower neck and upper thorax is consistent with the  patient's hair.      Impression    IMPRESSION: No active infiltrate.        BRANDEN DAVID MD       Medications - No data to display    Assessments & Plan (with Medical Decision Making)   RST negative with culture pending.  No evidence of peritonsillar cellulitis or abscess.  Chest x-ray is negative.  Unclear etiology for her fever.  Likely viral syndrome. Patient was instructed to continue OTC symptomatic treatment.  Follow up with PCP if no improvement in 5-7 days.  Worrisome reasons to seek care sooner discussed.      I have reviewed the nursing notes.    I have reviewed the findings, diagnosis, plan and need for follow up with the patient.      Discharge Medication List as of 9/24/2018  3:09 PM          Final diagnoses:   Viral syndrome       9/24/2018   [facility] EMERGENCY DEPARTMENT     Mallika Soriano APRN CNP  09/24/18 1146

## 2018-09-24 NOTE — LETTER
[facility] EMERGENCY DEPARTMENT  [address] [facility] [address]  [phone]          September 24, 2018    RE:  Carla Merlos                                                                                                                                                       [address] [address] [place] [id number]            To whom it may concern:    Carla Merlos was under my professional care for febrile illness. Patient is not able to return to work until she is free of fever for 24 hours.      Sincerely,             KAVEH Flaherty CNP

## 2018-09-26 LAB
BACTERIA SPEC CULT: ABNORMAL
SPECIMEN SOURCE: ABNORMAL

## 2019-06-05 ENCOUNTER — E-VISIT (OUTPATIENT)
Dept: FAMILY MEDICINE | Facility: CLINIC | Age: 29
End: 2019-06-05
Payer: COMMERCIAL

## 2019-06-05 DIAGNOSIS — R41.840 CONCENTRATION DEFICIT: Primary | ICD-10-CM

## 2019-06-05 PROCEDURE — 99444 ZZC PHYSICIAN ONLINE EVALUATION & MANAGEMENT SERVICE: CPT | Performed by: FAMILY MEDICINE

## 2019-07-17 ENCOUNTER — NURSE TRIAGE (OUTPATIENT)
Dept: NURSING | Facility: CLINIC | Age: 29
End: 2019-07-17

## 2019-07-17 NOTE — TELEPHONE ENCOUNTER
Recently tapered off of Celexa, having been on for approx 7 years.  Tapered off per PCP recommendations with last (1/4 pill) dose on Sunday (7/14/19).  Today having some minor random lightheadedness and feeling irritable, having some feelings of stress and has cried.  Transferred to scheduling.     Additional Information    Patient wants to be seen    New or changed psychiatric medications > 2 weeks ago and not feeling any better     Taper off complete, not on a new med.    Protocols used: DEPRESSION-A-OH

## 2019-07-22 ENCOUNTER — OFFICE VISIT (OUTPATIENT)
Dept: FAMILY MEDICINE | Facility: CLINIC | Age: 29
End: 2019-07-22
Payer: COMMERCIAL

## 2019-07-22 VITALS
DIASTOLIC BLOOD PRESSURE: 83 MMHG | WEIGHT: 192 LBS | HEIGHT: 66 IN | BODY MASS INDEX: 30.86 KG/M2 | SYSTOLIC BLOOD PRESSURE: 122 MMHG | HEART RATE: 89 BPM

## 2019-07-22 DIAGNOSIS — R79.89 ELEVATED TSH: ICD-10-CM

## 2019-07-22 DIAGNOSIS — F32.0 MILD MAJOR DEPRESSION (H): Primary | ICD-10-CM

## 2019-07-22 DIAGNOSIS — R42 LIGHTHEADEDNESS: ICD-10-CM

## 2019-07-22 LAB
ALBUMIN SERPL-MCNC: 4.4 G/DL (ref 3.4–5)
ALP SERPL-CCNC: 91 U/L (ref 40–150)
ALT SERPL W P-5'-P-CCNC: 37 U/L (ref 0–50)
ANION GAP SERPL CALCULATED.3IONS-SCNC: 6 MMOL/L (ref 3–14)
AST SERPL W P-5'-P-CCNC: 21 U/L (ref 0–45)
BILIRUB SERPL-MCNC: 0.8 MG/DL (ref 0.2–1.3)
BUN SERPL-MCNC: 18 MG/DL (ref 7–30)
CALCIUM SERPL-MCNC: 9.3 MG/DL (ref 8.5–10.1)
CHLORIDE SERPL-SCNC: 106 MMOL/L (ref 94–109)
CO2 SERPL-SCNC: 27 MMOL/L (ref 20–32)
CREAT SERPL-MCNC: 0.83 MG/DL (ref 0.52–1.04)
ERYTHROCYTE [DISTWIDTH] IN BLOOD BY AUTOMATED COUNT: 12.5 % (ref 10–15)
GFR SERPL CREATININE-BSD FRML MDRD: >90 ML/MIN/{1.73_M2}
GLUCOSE SERPL-MCNC: 65 MG/DL (ref 70–99)
HBA1C MFR BLD: 5 % (ref 0–5.6)
HCG UR QL: NEGATIVE
HCT VFR BLD AUTO: 43.5 % (ref 35–47)
HGB BLD-MCNC: 15.2 G/DL (ref 11.7–15.7)
MCH RBC QN AUTO: 30.8 PG (ref 26.5–33)
MCHC RBC AUTO-ENTMCNC: 34.9 G/DL (ref 31.5–36.5)
MCV RBC AUTO: 88 FL (ref 78–100)
PLATELET # BLD AUTO: 218 10E9/L (ref 150–450)
POTASSIUM SERPL-SCNC: 3.6 MMOL/L (ref 3.4–5.3)
PROLACTIN SERPL-MCNC: 14 UG/L (ref 3–27)
PROT SERPL-MCNC: 8 G/DL (ref 6.8–8.8)
RBC # BLD AUTO: 4.93 10E12/L (ref 3.8–5.2)
SODIUM SERPL-SCNC: 139 MMOL/L (ref 133–144)
T4 FREE SERPL-MCNC: 0.69 NG/DL (ref 0.76–1.46)
TSH SERPL DL<=0.005 MIU/L-ACNC: 4.76 MU/L (ref 0.4–4)
WBC # BLD AUTO: 9.8 10E9/L (ref 4–11)

## 2019-07-22 PROCEDURE — 81025 URINE PREGNANCY TEST: CPT | Performed by: FAMILY MEDICINE

## 2019-07-22 PROCEDURE — 85027 COMPLETE CBC AUTOMATED: CPT | Performed by: FAMILY MEDICINE

## 2019-07-22 PROCEDURE — 84146 ASSAY OF PROLACTIN: CPT | Performed by: FAMILY MEDICINE

## 2019-07-22 PROCEDURE — 80053 COMPREHEN METABOLIC PANEL: CPT | Performed by: FAMILY MEDICINE

## 2019-07-22 PROCEDURE — 36415 COLL VENOUS BLD VENIPUNCTURE: CPT | Performed by: FAMILY MEDICINE

## 2019-07-22 PROCEDURE — 83036 HEMOGLOBIN GLYCOSYLATED A1C: CPT | Performed by: FAMILY MEDICINE

## 2019-07-22 PROCEDURE — 84439 ASSAY OF FREE THYROXINE: CPT | Performed by: FAMILY MEDICINE

## 2019-07-22 PROCEDURE — 84443 ASSAY THYROID STIM HORMONE: CPT | Performed by: FAMILY MEDICINE

## 2019-07-22 PROCEDURE — 99214 OFFICE O/P EST MOD 30 MIN: CPT | Performed by: FAMILY MEDICINE

## 2019-07-22 RX ORDER — CITALOPRAM HYDROBROMIDE 20 MG/1
10 TABLET ORAL DAILY
Qty: 15 TABLET | Refills: 0 | Status: SHIPPED | OUTPATIENT
Start: 2019-07-22 | End: 2019-08-16

## 2019-07-22 ASSESSMENT — ANXIETY QUESTIONNAIRES
6. BECOMING EASILY ANNOYED OR IRRITABLE: MORE THAN HALF THE DAYS
7. FEELING AFRAID AS IF SOMETHING AWFUL MIGHT HAPPEN: SEVERAL DAYS
5. BEING SO RESTLESS THAT IT IS HARD TO SIT STILL: NOT AT ALL
2. NOT BEING ABLE TO STOP OR CONTROL WORRYING: MORE THAN HALF THE DAYS
GAD7 TOTAL SCORE: 10
3. WORRYING TOO MUCH ABOUT DIFFERENT THINGS: MORE THAN HALF THE DAYS
1. FEELING NERVOUS, ANXIOUS, OR ON EDGE: MORE THAN HALF THE DAYS

## 2019-07-22 ASSESSMENT — PATIENT HEALTH QUESTIONNAIRE - PHQ9
SUM OF ALL RESPONSES TO PHQ QUESTIONS 1-9: 5
5. POOR APPETITE OR OVEREATING: SEVERAL DAYS

## 2019-07-22 ASSESSMENT — MIFFLIN-ST. JEOR: SCORE: 1612.66

## 2019-07-22 NOTE — PROGRESS NOTES
"Subjective     Carla Merlos is a 29 year old female who presents to clinic today for the following health issues:    HPI     Establishing care with [place], closer to home.      Nurse Triage note:   7/17/19 2:27 PM   Recently tapered off of Celexa, having been on for approx 7 years.  Tapered off per PCP recommendations with last (1/4 pill) dose on Sunday (7/14/19).  Today having some minor random lightheadedness and feeling irritable, having some feelings of stress and has cried.     Additional Information    Patient wants to be seen    New or changed psychiatric medications > 2 weeks ago and not feeling any better     Taper off complete, not on a new med.  Protocols used: DEPRESSION-A-OH        PHQ-9 ([employer]) 7/22/2019   1.  Little interest or pleasure in doing things 1   2.  Feeling down, depressed, or hopeless 1   3.  Trouble falling or staying asleep, or sleeping too much 0   4.  Feeling tired or having little energy 1   5.  Poor appetite or overeating 0   6.  Feeling bad about yourself 1   7.  Trouble concentrating 0   8.  Moving slowly or restless 1   9.  Suicidal or self-harm thoughts 0   PHQ-9 Total Score 5     LATIA-7   [employer], 2002; Used with Permission) 7/22/2019   1. Feeling nervous, anxious, or on edge 2   2. Not being able to stop or control worrying 2   3. Worrying too much about different things 2   4. Trouble relaxing 1   5. Being so restless that it is hard to sit still 0   6. Becoming easily annoyed or irritable 2   7. Feeling afraid, as if something awful might happen 1   LATIA-7 Total Score 10                 Reviewed and updated as needed this visit by Provider         Review of Systems   ROS COMP: Constitutional, HEENT, cardiovascular, pulmonary, gi and gu systems are negative, except as otherwise noted.      Objective    /83   Pulse 89   Ht 1.676 m (5' 6\")   Wt 87.1 kg (192 lb)   BMI 30.99 kg/m    Body mass index is 30.99 kg/m .  Physical Exam   GENERAL: healthy, alert and no " distress  EYES: Eyes grossly normal to inspection, PERRL and conjunctivae and sclerae normal  HENT: ear canals and TM's normal, nose and mouth without ulcers or lesions  NECK: no adenopathy, no asymmetry, masses, or scars and thyroid normal to palpation  RESP: lungs clear to auscultation - no rales, rhonchi or wheezes  CV: regular rate and rhythm, normal S1 S2, no S3 or S4, no murmur, click or rub, no peripheral edema and peripheral pulses strong  ABDOMEN: soft, nontender, no hepatosplenomegaly, no masses and bowel sounds normal  MS: no gross musculoskeletal defects noted, no edema  SKIN: no suspicious lesions or rashes  NEURO: Normal strength and tone, sensory exam grossly normal, mentation intact, cranial nerves 2-12 intact, DTR's normal and symmetric, gait normal including heel/toe/tandem walking and Romberg normal  PSYCH: mentation appears normal, affect normal/bright    Diagnostic Test Results:  Results for orders placed or performed in visit on 07/22/19 (from the past 24 hour(s))   CBC with platelets   Result Value Ref Range    WBC 9.8 4.0 - 11.0 10e9/L    RBC Count 4.93 3.8 - 5.2 10e12/L    Hemoglobin 15.2 11.7 - 15.7 g/dL    Hematocrit 43.5 35.0 - 47.0 %    MCV 88 78 - 100 fl    MCH 30.8 26.5 - 33.0 pg    MCHC 34.9 31.5 - 36.5 g/dL    RDW 12.5 10.0 - 15.0 %    Platelet Count 218 150 - 450 10e9/L   Comprehensive metabolic panel (BMP + Alb, Alk Phos, ALT, AST, Total. Bili, TP)   Result Value Ref Range    Sodium 139 133 - 144 mmol/L    Potassium 3.6 3.4 - 5.3 mmol/L    Chloride 106 94 - 109 mmol/L    Carbon Dioxide 27 20 - 32 mmol/L    Anion Gap 6 3 - 14 mmol/L    Glucose 65 (L) 70 - 99 mg/dL    Urea Nitrogen 18 7 - 30 mg/dL    Creatinine 0.83 0.52 - 1.04 mg/dL    GFR Estimate >90 >60 mL/min/[1.73_m2]    GFR Estimate If Black >90 >60 mL/min/[1.73_m2]    Calcium 9.3 8.5 - 10.1 mg/dL    Bilirubin Total 0.8 0.2 - 1.3 mg/dL    Albumin 4.4 3.4 - 5.0 g/dL    Protein Total 8.0 6.8 - 8.8 g/dL    Alkaline Phosphatase 91  40 - 150 U/L    ALT 37 0 - 50 U/L    AST 21 0 - 45 U/L   TSH with free T4 reflex   Result Value Ref Range    TSH 4.76 (H) 0.40 - 4.00 mU/L   Prolactin   Result Value Ref Range    Prolactin 14 3 - 27 ug/L   Hemoglobin A1c   Result Value Ref Range    Hemoglobin A1C 5.0 0 - 5.6 %   T4 free   Result Value Ref Range    T4 Free 0.69 (L) 0.76 - 1.46 ng/dL   HCG Qual, Urine ([id number])   Result Value Ref Range    HCG Qual Urine Negative NEG^Negative           Assessment & Plan     1. Mild major depression (H)  Depression stable after weaning off celexa but anxiety worse.  New symptoms may be related to stopping med  - citalopram (CELEXA) 20 MG tablet; Take 0.5 tablets (10 mg) by mouth daily  Dispense: 15 tablet; Refill: 0    Discussed trial of low dose celexa and she is agreeable.  May be helpful to note if any symptoms improve with restarting medication.  She is in favor of being on celexa, just at a lower dose  2. Lightheadedness  Brain fog symptoms of uncertain etiology.  TSH mildly elevated and T4 low.  Plan to recheck in 4 weeks.  The glucose of 65 is likely normal in nondiabetic patient.  A1C in normal range  - CBC with platelets  - Comprehensive metabolic panel (BMP + Alb, Alk Phos, ALT, AST, Total. Bili, TP)  - TSH with free T4 reflex  - Prolactin  - Hemoglobin A1c  - HCG Qual, Urine ([id number])  - T4 free  - T4 free         Follow-up in 2 weeks, sooner if issues arise  See Patient Instructions        Aminata Ordonez MD  [facility]

## 2019-07-22 NOTE — PATIENT INSTRUCTIONS
Restart the celexa at 10mg po daily and monitor your symptoms    Recheck in 2 weeks, sooner if issues arise.

## 2019-07-23 ASSESSMENT — ANXIETY QUESTIONNAIRES: GAD7 TOTAL SCORE: 10

## 2019-08-15 ASSESSMENT — ENCOUNTER SYMPTOMS
COUGH: 0
HEMATURIA: 0
HEARTBURN: 0
ABDOMINAL PAIN: 0
DIZZINESS: 0
SORE THROAT: 0
CONSTIPATION: 0
PALPITATIONS: 0
EYE PAIN: 0
WEAKNESS: 0
ARTHRALGIAS: 1
DIARRHEA: 0
FEVER: 0
MYALGIAS: 0
CHILLS: 0
BREAST MASS: 0
HEMATOCHEZIA: 0
JOINT SWELLING: 0
SHORTNESS OF BREATH: 0
NERVOUS/ANXIOUS: 1
DYSURIA: 0
NAUSEA: 0
FREQUENCY: 0
HEADACHES: 0
PARESTHESIAS: 0

## 2019-08-16 ENCOUNTER — OFFICE VISIT (OUTPATIENT)
Dept: FAMILY MEDICINE | Facility: CLINIC | Age: 29
End: 2019-08-16
Payer: COMMERCIAL

## 2019-08-16 VITALS
HEIGHT: 66 IN | SYSTOLIC BLOOD PRESSURE: 111 MMHG | HEART RATE: 89 BPM | DIASTOLIC BLOOD PRESSURE: 63 MMHG | TEMPERATURE: 97.2 F | WEIGHT: 192 LBS | BODY MASS INDEX: 30.86 KG/M2

## 2019-08-16 DIAGNOSIS — R79.89 ELEVATED TSH: ICD-10-CM

## 2019-08-16 DIAGNOSIS — F32.1 MODERATE MAJOR DEPRESSION (H): ICD-10-CM

## 2019-08-16 DIAGNOSIS — F32.0 MILD MAJOR DEPRESSION (H): ICD-10-CM

## 2019-08-16 DIAGNOSIS — B36.0 TINEA VERSICOLOR: ICD-10-CM

## 2019-08-16 DIAGNOSIS — Z00.00 ROUTINE GENERAL MEDICAL EXAMINATION AT A HEALTH CARE FACILITY: Primary | ICD-10-CM

## 2019-08-16 LAB — TSH SERPL DL<=0.005 MIU/L-ACNC: 2.72 MU/L (ref 0.4–4)

## 2019-08-16 PROCEDURE — 84443 ASSAY THYROID STIM HORMONE: CPT | Performed by: FAMILY MEDICINE

## 2019-08-16 PROCEDURE — 36415 COLL VENOUS BLD VENIPUNCTURE: CPT | Performed by: FAMILY MEDICINE

## 2019-08-16 PROCEDURE — 99213 OFFICE O/P EST LOW 20 MIN: CPT | Mod: 25 | Performed by: FAMILY MEDICINE

## 2019-08-16 PROCEDURE — 99395 PREV VISIT EST AGE 18-39: CPT | Performed by: FAMILY MEDICINE

## 2019-08-16 RX ORDER — CITALOPRAM HYDROBROMIDE 20 MG/1
10 TABLET ORAL DAILY
Qty: 45 TABLET | Refills: 3 | Status: SHIPPED | OUTPATIENT
Start: 2019-08-16

## 2019-08-16 ASSESSMENT — PATIENT HEALTH QUESTIONNAIRE - PHQ9
5. POOR APPETITE OR OVEREATING: NOT AT ALL
SUM OF ALL RESPONSES TO PHQ QUESTIONS 1-9: 4

## 2019-08-16 ASSESSMENT — ANXIETY QUESTIONNAIRES
2. NOT BEING ABLE TO STOP OR CONTROL WORRYING: NOT AT ALL
1. FEELING NERVOUS, ANXIOUS, OR ON EDGE: SEVERAL DAYS
5. BEING SO RESTLESS THAT IT IS HARD TO SIT STILL: NOT AT ALL
6. BECOMING EASILY ANNOYED OR IRRITABLE: SEVERAL DAYS
GAD7 TOTAL SCORE: 2
7. FEELING AFRAID AS IF SOMETHING AWFUL MIGHT HAPPEN: NOT AT ALL
3. WORRYING TOO MUCH ABOUT DIFFERENT THINGS: NOT AT ALL

## 2019-08-16 ASSESSMENT — MIFFLIN-ST. JEOR: SCORE: 1612.66
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
## 2019-08-17 ASSESSMENT — ANXIETY QUESTIONNAIRES: GAD7 TOTAL SCORE: 2

## 2019-09-29 ENCOUNTER — HOSPITAL ENCOUNTER (EMERGENCY)
Facility: CLINIC | Age: 29
Discharge: HOME | End: 2019-09-29
Attending: PHYSICIAN ASSISTANT | Admitting: PHYSICIAN ASSISTANT
Payer: COMMERCIAL

## 2019-09-29 VITALS
OXYGEN SATURATION: 98 % | SYSTOLIC BLOOD PRESSURE: 120 MMHG | RESPIRATION RATE: 20 BRPM | BODY MASS INDEX: 30.02 KG/M2 | TEMPERATURE: 99 F | DIASTOLIC BLOOD PRESSURE: 86 MMHG | WEIGHT: 186 LBS

## 2019-09-29 DIAGNOSIS — J06.9 VIRAL URI WITH COUGH: ICD-10-CM

## 2019-09-29 PROCEDURE — G0463 HOSPITAL OUTPT CLINIC VISIT: HCPCS | Performed by: PHYSICIAN ASSISTANT

## 2019-09-29 PROCEDURE — 99214 OFFICE O/P EST MOD 30 MIN: CPT | Mod: Z6 | Performed by: PHYSICIAN ASSISTANT

## 2019-09-29 NOTE — ED PROVIDER NOTES
History     Chief Complaint   Patient presents with     Cough     cough started yesterday, URI sx friday     HPI  Carla Merlos is a 29 year old female who presents to the urgent care with concern over cough which developed earlier today.  This was preceded by 2 days of sore throat, rhinorrhea, postnasal drainage.  She has not had any objective fever, chills, nausea, dyspnea, wheezing, vomiting, diarrhea or abdominal pain.  She states concerned that she did have a household contact with similar symptoms who was recently diagnosed with bronchitis.  She denies any prior history of asthma, COPD or other breathing related disorders.    Allergies:  Allergies   Allergen Reactions     Seasonal Allergies Cough     Pollen and trees     Amoxicillin Rash     Problem List:    Patient Active Problem List    Diagnosis Date Noted     Mild major depression (H) 08/16/2019     Priority: Medium     Elevated TSH 08/16/2019     Priority: Medium     Tinea versicolor 08/16/2019     Priority: Medium     Oligomenorrhea 01/05/2016     Priority: Medium     Allergic rhinitis 06/09/2015     Priority: Medium     Allergy testing with + response to dust mite, alternaria, cladosporium, birch and oak pollen, grass pollen and ragweed pollen       Angioedema of lips 06/09/2015     Priority: Medium     Thought secondary to ibuprofen use (per allergist eval)       Anxiety 10/31/2013     Priority: Medium     Insomnia 10/31/2013     Priority: Medium     Melanocytic nevus 09/04/2012     Priority: Medium     (Problem list name updated by automated process. Provider to review and confirm.)       Bruising 05/20/2011     Priority: Medium     Likely secondary to SSRI use       Vitamin D deficiency 01/31/2011     Priority: Medium     Due for recheck early 5/11       Moderate major depression (H) 01/26/2011     Priority: Medium     CARDIOVASCULAR SCREENING; LDL GOAL LESS THAN 160 01/26/2011     Priority: Medium        Past Medical History:    Past Medical  History:   Diagnosis Date     Depressive disorder      NO ACTIVE PROBLEMS        Past Surgical History:    Past Surgical History:   Procedure Laterality Date     BIOPSY      two benign moles removed     none       Family History:    Family History   Problem Relation Age of Onset     Breast Cancer Maternal Grandmother      C.A.D. Maternal Grandmother      Cardiovascular Maternal Grandmother      Depression Maternal Grandmother      Diabetes Maternal Grandfather      Hypertension Mother      Cardiovascular Paternal Grandmother      Depression Father      Diabetes Paternal Grandfather      Diabetes Other      Depression Sister      Depression Sister      Unknown/Adopted No family hx of      Asthma No family hx of      Cerebrovascular Disease No family hx of      Alcohol/Drug No family hx of      Prostate Cancer No family hx of      Cancer - colorectal No family hx of      Thyroid Disease No family hx of      Glaucoma No family hx of      Macular Degeneration No family hx of      Social History:  Marital Status:   [2]  Social History     Tobacco Use     Smoking status: Never Smoker     Smokeless tobacco: Never Used     Tobacco comment:  smokes   Substance Use Topics     Alcohol use: No     Drug use: No      Medications:    albuterol (PROAIR HFA/PROVENTIL HFA/VENTOLIN HFA) 108 (90 BASE) MCG/ACT Inhaler  Cholecalciferol (VITAMIN D) 2000 UNIT CAPS  citalopram (CELEXA) 20 MG tablet      Review of Systems  CONSTITUTIONAL:NEGATIVE for fever, chills, change in weight  INTEGUMENTARY/SKIN: NEGATIVE for worrisome rashes, moles or lesions  EYES: NEGATIVE for vision changes or irritation  ENT/MOUTH: POSITIVE for sore throat, nasal congestion, post-nasal drainage and NEGATIVE for ear pain   RESP:POSITIVE for cough and NEGATIVE for SOB/dyspnea and wheezing  GI: NEGATIVE for abdominal pain, diarrhea, nausea and vomiting  Physical Exam   BP: 120/86  Heart Rate: 125  Temp: 99  F (37.2  C)  Resp: 20  Weight: 84.4 kg (186  lb)  SpO2: 98 %  Physical Exam  GENERAL APPEARANCE: healthy, alert and no distress  EYES: EOMI,  PERRL, conjunctiva clear  HENT: ear canals and TM's normal.  Nose and mouth without ulcers, erythema or lesions  NECK: supple, nontender, no lymphadenopathy  RESP: lungs clear to auscultation - no rales, rhonchi or wheezes  CV:tachycardia,  regular rhythm, normal S1 S2, no murmur noted  SKIN: no suspicious lesions or rashes  ED Course        Procedures        Critical Care time:  none        No results found for this or any previous visit (from the past 24 hour(s)).  Medications - No data to display    Assessments & Plan (with Medical Decision Making)     I have reviewed the nursing notes.    I have reviewed the findings, diagnosis, plan and need for follow up with the patient.      Discharge Medication List as of 9/29/2019  4:17 PM        Final diagnoses:   Viral URI with cough     29-year-old female presents to the urgent care with concern over cough developed earlier today preceded by 2 days of sore throat, rhinorrhea, postnasal drainage.  She was noted to be tachycardic upon arrival, remainder of vital signs within normal limits.  Physical exam findings as described above were benign.  Patient was monitored in the department for an additional 15 to 20 minutes and repeat heart rate had improved however remained elevated in the 1 teens.  I did review prior vitals which shows patient has had similar readings in the past.  Her symptoms are consistent with viral URI.  I do not suspect pneumonia, pertussis, PE however did discuss her/benefits of further evaluation and patient agreed to defer.  She was instructed to follow-up with her primary care provider to discuss heart rate further.  Worrisome reasons to return to the ER/UC sooner discussed.    Disclaimer: This note consists of symbols derived from keyboarding, dictation, and/or voice recognition software. As a result, there may be errors in the script that have gone  undetected.  Please consider this when interpreting information found in the chart.      9/29/2019   [facility] EMERGENCY DEPARTMENT     Winter Chacon PA-C  10/01/19 1737

## 2019-09-29 NOTE — ED AVS SNAPSHOT
[facility] Emergency Department  [address] [address]  Phone:  [phone]  Fax:  [phone]                                    Carla Merlos   MRN: [id number]    Department:  [facility] Emergency Department   Date of Visit:  9/29/2019           After Visit Summary Signature Page    I have received my discharge instructions, and my questions have been answered. I have discussed any challenges I see with this plan with the nurse or doctor.    ..........................................................................................................................................  Patient/Patient Representative Signature      ..........................................................................................................................................  Patient Representative Print Name and Relationship to Patient    ..................................................               ................................................  Date                                   Time    ..........................................................................................................................................  Reviewed by Signature/Title    ...................................................              ..............................................  Date                                               Time          22EPIC Rev 08/18

## 2019-09-29 NOTE — ED TRIAGE NOTES
Patient reports cough since this morning. Other URI symptoms for 2 days. No shortness of breath or wheezing. Osiris Lubin LPN on 9/29/2019 at 3:27 PM

## 2019-10-06 ENCOUNTER — HOSPITAL ENCOUNTER (EMERGENCY)
Facility: CLINIC | Age: 29
Discharge: HOME | End: 2019-10-06
Attending: PHYSICIAN ASSISTANT | Admitting: PHYSICIAN ASSISTANT
Payer: COMMERCIAL

## 2019-10-06 VITALS
BODY MASS INDEX: 30.02 KG/M2 | WEIGHT: 186 LBS | SYSTOLIC BLOOD PRESSURE: 124 MMHG | TEMPERATURE: 97.8 F | OXYGEN SATURATION: 99 % | HEART RATE: 70 BPM | DIASTOLIC BLOOD PRESSURE: 80 MMHG | RESPIRATION RATE: 16 BRPM

## 2019-10-06 DIAGNOSIS — J06.9 VIRAL URI WITH COUGH: ICD-10-CM

## 2019-10-06 DIAGNOSIS — J98.01 ACUTE BRONCHOSPASM: ICD-10-CM

## 2019-10-06 PROCEDURE — 99214 OFFICE O/P EST MOD 30 MIN: CPT | Mod: Z6 | Performed by: PHYSICIAN ASSISTANT

## 2019-10-06 PROCEDURE — G0463 HOSPITAL OUTPT CLINIC VISIT: HCPCS | Performed by: PHYSICIAN ASSISTANT

## 2019-10-06 RX ORDER — BENZONATATE 200 MG/1
200 CAPSULE ORAL 3 TIMES DAILY PRN
Qty: 21 CAPSULE | Refills: 0 | Status: SHIPPED | OUTPATIENT
Start: 2019-10-06 | End: 2019-11-08

## 2019-10-06 ASSESSMENT — ENCOUNTER SYMPTOMS
WHEEZING: 1
VOICE CHANGE: 1
COUGH: 1
FEVER: 0
CARDIOVASCULAR NEGATIVE: 1
CONSTITUTIONAL NEGATIVE: 1

## 2019-10-06 NOTE — ED PROVIDER NOTES
"  History     Chief Complaint   Patient presents with     URI     cough \" caught it from my \" about a week. Now more wheezy then productive.      HPI  Carla Merlos is a 29 year old female who presents with complaints of persistent cough that was initially productive but now non-productive for the past week.  Pt also c/o associated wheezing that has since developed.  She has some drainage and congestion as well as a hoarse voice.  Denies fevers, chills, neck pain/stiffness, rash, sore throat, sinus pressure, chest pain, or shortness of breath.  Pt denies hx asthma but states she has been prescribed an inhaler for allergies.      Allergies:  Allergies   Allergen Reactions     Seasonal Allergies Cough     Pollen and trees     Amoxicillin Rash       Problem List:    Patient Active Problem List    Diagnosis Date Noted     Mild major depression (H) 08/16/2019     Priority: Medium     Elevated TSH 08/16/2019     Priority: Medium     Tinea versicolor 08/16/2019     Priority: Medium     Oligomenorrhea 01/05/2016     Priority: Medium     Allergic rhinitis 06/09/2015     Priority: Medium     Allergy testing with + response to dust mite, alternaria, cladosporium, birch and oak pollen, grass pollen and ragweed pollen       Angioedema of lips 06/09/2015     Priority: Medium     Thought secondary to ibuprofen use (per allergist eval)       Anxiety 10/31/2013     Priority: Medium     Insomnia 10/31/2013     Priority: Medium     Melanocytic nevus 09/04/2012     Priority: Medium     (Problem list name updated by automated process. Provider to review and confirm.)       Bruising 05/20/2011     Priority: Medium     Likely secondary to SSRI use       Vitamin D deficiency 01/31/2011     Priority: Medium     Due for recheck early 5/11       Moderate major depression (H) 01/26/2011     Priority: Medium     CARDIOVASCULAR SCREENING; LDL GOAL LESS THAN 160 01/26/2011     Priority: Medium        Past Medical History:    Past " Medical History:   Diagnosis Date     Depressive disorder      NO ACTIVE PROBLEMS        Past Surgical History:    Past Surgical History:   Procedure Laterality Date     BIOPSY      two benign moles removed     none         Family History:    Family History   Problem Relation Age of Onset     Breast Cancer Maternal Grandmother      C.A.D. Maternal Grandmother      Cardiovascular Maternal Grandmother      Depression Maternal Grandmother      Diabetes Maternal Grandfather      Hypertension Mother      Cardiovascular Paternal Grandmother      Depression Father      Diabetes Paternal Grandfather      Diabetes Other      Depression Sister      Depression Sister      Unknown/Adopted No family hx of      Asthma No family hx of      Cerebrovascular Disease No family hx of      Alcohol/Drug No family hx of      Prostate Cancer No family hx of      Cancer - colorectal No family hx of      Thyroid Disease No family hx of      Glaucoma No family hx of      Macular Degeneration No family hx of        Social History:  Marital Status:   [2]  Social History     Tobacco Use     Smoking status: Never Smoker     Smokeless tobacco: Never Used     Tobacco comment:  smokes   Substance Use Topics     Alcohol use: No     Drug use: No        Medications:    benzonatate (TESSALON) 200 MG capsule  albuterol (PROAIR HFA/PROVENTIL HFA/VENTOLIN HFA) 108 (90 BASE) MCG/ACT Inhaler  Cholecalciferol (VITAMIN D) 2000 UNIT CAPS  citalopram (CELEXA) 20 MG tablet          Review of Systems   Constitutional: Negative.  Negative for fever.   HENT: Positive for congestion, postnasal drip and voice change.    Respiratory: Positive for cough and wheezing.    Cardiovascular: Negative.    Skin: Negative.    All other systems reviewed and are negative.      Physical Exam   BP: 124/80  Pulse: 70  Temp: 97.8  F (36.6  C)  Resp: 16  Weight: 84.4 kg (186 lb)  SpO2: 99 %      Physical Exam  Constitutional:       General: She is not in acute distress.      Appearance: She is well-developed. She is not ill-appearing or toxic-appearing.   HENT:      Head: Normocephalic and atraumatic.      Right Ear: Hearing, tympanic membrane, ear canal and external ear normal.      Left Ear: Hearing, tympanic membrane, ear canal and external ear normal.      Nose: Mucosal edema and congestion present. No rhinorrhea.      Mouth/Throat:      Lips: Pink. No lesions.      Mouth: Mucous membranes are moist. No oral lesions.      Pharynx: Oropharynx is clear. Uvula midline. No pharyngeal swelling, oropharyngeal exudate, posterior oropharyngeal erythema or uvula swelling.      Tonsils: No tonsillar exudate or tonsillar abscesses.   Eyes:      Conjunctiva/sclera: Conjunctivae normal.      Pupils: Pupils are equal, round, and reactive to light.   Neck:      Musculoskeletal: Full passive range of motion without pain, normal range of motion and neck supple. Normal range of motion. No edema, erythema or neck rigidity.   Cardiovascular:      Rate and Rhythm: Normal rate and regular rhythm.      Heart sounds: Normal heart sounds.   Pulmonary:      Effort: Pulmonary effort is normal. No respiratory distress.      Breath sounds: Normal air entry. No stridor, decreased air movement or transmitted upper airway sounds. Wheezing present. No decreased breath sounds, rhonchi or rales.   Lymphadenopathy:      Cervical: No cervical adenopathy.   Skin:     General: Skin is warm and dry.      Findings: No rash.   Neurological:      Mental Status: She is alert and oriented to person, place, and time.         ED Course        Procedures    No results found for this or any previous visit (from the past 24 hour(s)).    Medications - No data to display    Assessments & Plan (with Medical Decision Making)     Pt is a 29 year old female who presents with complaints of persistent cough that was initially productive but now non-productive for the past week.  Pt also c/o associated wheezing that has since developed.   She has some drainage and congestion as well as a hoarse voice.  Pt is afebrile on arrival.  Exam as above.  Encouraged symptomatic treatments at home of suspected viral illness.  Instructed patient to use her Albuterol inhaler that she has at home.  Return precautions were reviewed.  Hand-outs were provided.    Pt was sent with Tessalon.  Instructed parent to have patient follow-up with PCP if no improvement in 5-7 days for continued care and management or sooner if new or worsening symptoms.  She is to return to the ED for persistent and/or worsening symptoms.  Pt's parent expressed understanding with and agreement with the plan, and patient was discharged home in good condition.    I have reviewed the nursing notes.    I have reviewed the findings, diagnosis, plan and need for follow up with the patient's parent.    Discharge Medication List as of 10/6/2019  6:00 PM      START taking these medications    Details   benzonatate (TESSALON) 200 MG capsule Take 1 capsule (200 mg) by mouth 3 times daily as needed for cough, Disp-21 capsule, R-0, E-Prescribe             Final diagnoses:   Viral URI with cough   Acute bronchospasm       10/6/2019   [facility] EMERGENCY DEPARTMENT      Disclaimer:  This note consists of symbols derived from keyboarding, dictation and/or voice recognition software.  As a result, there may be errors in the script that have gone undetected.  Please consider this when interpreting information found in this chart.     Radha Albrecht PA-C  10/06/19 1838

## 2019-10-06 NOTE — ED AVS SNAPSHOT
[facility] Emergency Department  [address] [address]  Phone:  [phone]  Fax:  [phone]                                    Carla Merlos   MRN: [id number]    Department:  [facility] Emergency Department   Date of Visit:  10/6/2019           After Visit Summary Signature Page    I have received my discharge instructions, and my questions have been answered. I have discussed any challenges I see with this plan with the nurse or doctor.    ..........................................................................................................................................  Patient/Patient Representative Signature      ..........................................................................................................................................  Patient Representative Print Name and Relationship to Patient    ..................................................               ................................................  Date                                   Time    ..........................................................................................................................................  Reviewed by Signature/Title    ...................................................              ..............................................  Date                                               Time          22EPIC Rev 08/18

## 2019-11-07 ENCOUNTER — TELEPHONE (OUTPATIENT)
Dept: FAMILY MEDICINE | Facility: CLINIC | Age: 29
End: 2019-11-07

## 2019-11-07 NOTE — TELEPHONE ENCOUNTER
"Red flag call, hand off received.  Patient reports feeling \"stressed\" x2 weeks.  Noted occasional sternal pressure-DENIES chest pain or SOA.  Patient looks at fit bit HR while having this symptoms, notices HR .  Patient is able to slow HR(to baseline) to with rest, slow deep breathing.  Has been monitoring HR on fitbit x1 week.    HR during sleep 70-80.  HR with walking, stairs .    Patient was ill last month, harsh cough.  Patient has been using Albuterol intermittently.  Patient takes supplement daily that has caffeine.  Has been drinking less volume of water than baseline.  Patient is thinking about EPA at work to help with stress management.    Patient did hold the supplement \"a day or so\", HR did not change.  Reviewed symptoms that would need ED visit-with .  Patient verbalizes understanding, agrees.  Scheduled appointment for tomorrow at  as patient requesting appointment after 3pm as she is working.  Zoila Mandel RN       "

## 2019-11-08 ENCOUNTER — OFFICE VISIT (OUTPATIENT)
Dept: FAMILY MEDICINE | Facility: CLINIC | Age: 29
End: 2019-11-08
Payer: COMMERCIAL

## 2019-11-08 VITALS
TEMPERATURE: 97.9 F | OXYGEN SATURATION: 100 % | HEART RATE: 87 BPM | WEIGHT: 190.2 LBS | RESPIRATION RATE: 22 BRPM | DIASTOLIC BLOOD PRESSURE: 74 MMHG | BODY MASS INDEX: 30.57 KG/M2 | SYSTOLIC BLOOD PRESSURE: 122 MMHG | HEIGHT: 66 IN

## 2019-11-08 DIAGNOSIS — F32.1 MODERATE MAJOR DEPRESSION (H): ICD-10-CM

## 2019-11-08 DIAGNOSIS — F43.0 STRESS REACTION: Primary | ICD-10-CM

## 2019-11-08 DIAGNOSIS — Z28.21 INFLUENZA VACCINATION DECLINED BY PATIENT: ICD-10-CM

## 2019-11-08 PROCEDURE — 99214 OFFICE O/P EST MOD 30 MIN: CPT | Performed by: PHYSICIAN ASSISTANT

## 2019-11-08 ASSESSMENT — ANXIETY QUESTIONNAIRES
IF YOU CHECKED OFF ANY PROBLEMS ON THIS QUESTIONNAIRE, HOW DIFFICULT HAVE THESE PROBLEMS MADE IT FOR YOU TO DO YOUR WORK, TAKE CARE OF THINGS AT HOME, OR GET ALONG WITH OTHER PEOPLE: VERY DIFFICULT
2. NOT BEING ABLE TO STOP OR CONTROL WORRYING: MORE THAN HALF THE DAYS
6. BECOMING EASILY ANNOYED OR IRRITABLE: SEVERAL DAYS
7. FEELING AFRAID AS IF SOMETHING AWFUL MIGHT HAPPEN: MORE THAN HALF THE DAYS
3. WORRYING TOO MUCH ABOUT DIFFERENT THINGS: MORE THAN HALF THE DAYS
1. FEELING NERVOUS, ANXIOUS, OR ON EDGE: NEARLY EVERY DAY
GAD7 TOTAL SCORE: 11
5. BEING SO RESTLESS THAT IT IS HARD TO SIT STILL: NOT AT ALL

## 2019-11-08 ASSESSMENT — MIFFLIN-ST. JEOR: SCORE: 1604.49

## 2019-11-08 ASSESSMENT — PATIENT HEALTH QUESTIONNAIRE - PHQ9
SUM OF ALL RESPONSES TO PHQ QUESTIONS 1-9: 4
5. POOR APPETITE OR OVEREATING: SEVERAL DAYS

## 2019-11-08 NOTE — PATIENT INSTRUCTIONS
Schedule an appointment with the therapist at work.   We'll leave the celexa at a low dose, but you may want to consider changing this to a different medication if you feel your stress levels remain high.   Avoid supplements (especially with caffeine).   Stay active and eat healthy/drink plenty of water.   Try not to focus too much on your fit bit tracker (this can sometimes cause worry and that heart rate to increase)

## 2019-11-08 NOTE — PROGRESS NOTES
"Subjective     Carla Merlos is a 29 year old female who presents to clinic today for the following health issues:    HPI   Depression and Anxiety Follow-Up    How are you doing with your depression since your last visit? Improved     How are you doing with your anxiety since your last visit?  Worsened   Are you having other symptoms that might be associated with depression or anxiety? YES    RN Triage Note 11/07/19:  Patient reports feeling \"stressed\" x2 weeks.  Noted occasional sternal pressure-DENIES chest pain or SOA.  Patient looks at fit bit HR while having this symptoms, notices HR .  Patient is able to slow HR(to baseline) to with rest, slow deep breathing.  Has been monitoring HR on fitbit x1 week.     HR during sleep 70-80.  HR with walking, stairs .     Patient was ill last month, harsh cough.  Patient has been using Albuterol intermittently.  Patient takes supplement daily that has caffeine.  Has been drinking less volume of water than baseline.  Patient is thinking about EPA at work to help with stress management.    Zoila Mandel RN     Patient did hold the supplement \"a day or so\", HR did not change.    She had a bad cough in October and seen in Urgent Care (while on mucinex/albuterol).  HR was noted to be high then.      She does have increased stress in her life  She has been on Celexa for depression for years.  She has been weaning off this for the past few months (was on 40 mg).   While on 40 mg she didn't feel well and felt brain fogginess.      No panic attacks.      Started on a coffee supplement in August to help with brain fog/decreased motivation.        Social History     Tobacco Use     Smoking status: Never Smoker     Smokeless tobacco: Never Used     Tobacco comment:  smokes   Substance Use Topics     Alcohol use: No     Drug use: No     PHQ 7/22/2019 8/16/2019 11/8/2019   PHQ-9 Total Score 5 4 4   Q9: Thoughts of better off dead/self-harm past 2 weeks Not at all " Not at all Not at all     LATIA-7 SCORE 7/22/2019 8/16/2019 11/8/2019   Total Score - - -   Total Score - - -   Total Score 10 2 11     Last PHQ-9 11/8/2019   1.  Little interest or pleasure in doing things 1   2.  Feeling down, depressed, or hopeless 1   3.  Trouble falling or staying asleep, or sleeping too much 0   4.  Feeling tired or having little energy 1   5.  Poor appetite or overeating 0   6.  Feeling bad about yourself 0   7.  Trouble concentrating 1   8.  Moving slowly or restless 0   Q9: Thoughts of better off dead/self-harm past 2 weeks 0   PHQ-9 Total Score 4   Difficulty at work, home, or with people Not difficult at all     LATIA-7  11/8/2019   1. Feeling nervous, anxious, or on edge 3   2. Not being able to stop or control worrying 2   3. Worrying too much about different things 2   4. Trouble relaxing 1   5. Being so restless that it is hard to sit still 0   6. Becoming easily annoyed or irritable 1   7. Feeling afraid, as if something awful might happen 2   LATIA-7 Total Score 11   If you checked any problems, how difficult have they made it for you to do your work, take care of things at home, or get along with other people? Very difficult         Suicide Assessment Five-step Evaluation and Treatment (SAFE-T)    [facility]        Patient Active Problem List   Diagnosis     Moderate major depression (H)     CARDIOVASCULAR SCREENING; LDL GOAL LESS THAN 160     Vitamin D deficiency     Bruising     Melanocytic nevus     Anxiety     Insomnia     Allergic rhinitis     Angioedema of lips     Oligomenorrhea     Mild major depression (H)     Elevated TSH     Tinea versicolor     Past Surgical History:   Procedure Laterality Date     BIOPSY      two benign moles removed     none         Social History     Tobacco Use     Smoking status: Never Smoker     Smokeless tobacco: Never Used     Tobacco comment:  smokes   Substance Use Topics     Alcohol use: No     Family History   Problem Relation Age of  "Onset     Breast Cancer Maternal Grandmother      C.A.D. Maternal Grandmother      Cardiovascular Maternal Grandmother      Depression Maternal Grandmother      Diabetes Maternal Grandfather      Hypertension Mother      Cardiovascular Paternal Grandmother      Depression Father      Diabetes Paternal Grandfather      Diabetes Other      Depression Sister      Depression Sister      Unknown/Adopted No family hx of      Asthma No family hx of      Cerebrovascular Disease No family hx of      Alcohol/Drug No family hx of      Prostate Cancer No family hx of      Cancer - colorectal No family hx of      Thyroid Disease No family hx of      Glaucoma No family hx of      Macular Degeneration No family hx of          Current Outpatient Medications   Medication Sig Dispense Refill     citalopram (CELEXA) 20 MG tablet Take 0.5 tablets (10 mg) by mouth daily 45 tablet 3     albuterol (PROAIR HFA/PROVENTIL HFA/VENTOLIN HFA) 108 (90 BASE) MCG/ACT Inhaler Inhale 2 puffs into the lungs every 4 hours as needed for shortness of breath / dyspnea or wheezing 1 Inhaler 1     Cholecalciferol (VITAMIN D) 2000 UNIT CAPS Take 1 capsule by mouth daily. Patient        BP Readings from Last 3 Encounters:   11/08/19 122/74   10/06/19 124/80   09/29/19 120/86    Wt Readings from Last 3 Encounters:   11/08/19 86.3 kg (190 lb 3.2 oz)   10/06/19 84.4 kg (186 lb)   09/29/19 84.4 kg (186 lb)                      Reviewed and updated as needed this visit by Provider         Review of Systems   ROS COMP: Constitutional, HEENT, cardiovascular, pulmonary, GI, , musculoskeletal, neuro, skin, endocrine and psych systems are negative, except as otherwise noted.      Objective    /74   Pulse 87   Temp 97.9  F (36.6  C) (Tympanic)   Resp 22   Ht 1.676 m (5' 6\")   Wt 86.3 kg (190 lb 3.2 oz)   SpO2 100%   Breastfeeding No   BMI 30.70 kg/m    Body mass index is 30.7 kg/m .  Physical Exam   GENERAL: healthy, alert and no distress  NECK: no " "adenopathy, no asymmetry, masses, or scars and thyroid normal to palpation  RESP: lungs clear to auscultation - no rales, rhonchi or wheezes  CV: regular rate and rhythm, normal S1 S2, no S3 or S4, no murmur, click or rub, no peripheral edema and peripheral pulses strong  ABDOMEN: soft, nontender, no hepatosplenomegaly, no masses and bowel sounds normal  MS: no gross musculoskeletal defects noted, no edema    Diagnostic Test Results:  Labs reviewed in Epic        Assessment & Plan     1. Stress reaction  Reassured Carla that her symptoms are most consistent with a stress reaction and that her heart sounds normal on exam today.  There is a normal fluctuation on HR in response to stress/life situations . . . Sometimes evening monitoring HR with fitbit can increase the HR if she is worried about it and getting anxious over the readings.    Patient Instructions   Schedule an appointment with the therapist at work.   We'll leave the celexa at a low dose, but you may want to consider changing this to a different medication if you feel your stress levels remain high.   Avoid supplements (especially with caffeine).   Stay active and eat healthy/drink plenty of water.   Try not to focus too much on your fit bit tracker (this can sometimes cause worry and that heart rate to increase)            2. Moderate major depression (H)  Stable.     3. Influenza vaccination declined by patient         BMI:   Estimated body mass index is 30.7 kg/m  as calculated from the following:    Height as of this encounter: 1.676 m (5' 6\").    Weight as of this encounter: 86.3 kg (190 lb 3.2 oz).   Weight management plan: Discussed healthy diet and exercise guidelines            Return in about 1 month (around 12/8/2019) for a recheck if symptoms do not improve.    Delia Hassan PA-C  [facility]      "

## 2019-11-09 ASSESSMENT — ANXIETY QUESTIONNAIRES: GAD7 TOTAL SCORE: 11

## 2020-03-01 ENCOUNTER — HEALTH MAINTENANCE LETTER (OUTPATIENT)
Age: 30
End: 2020-03-01

## 2020-12-14 ENCOUNTER — HEALTH MAINTENANCE LETTER (OUTPATIENT)
Age: 30
End: 2020-12-14

## 2021-10-02 ENCOUNTER — HEALTH MAINTENANCE LETTER (OUTPATIENT)
Age: 31
End: 2021-10-02

## 2021-10-19 PROBLEM — F32.9 MAJOR DEPRESSION: Status: ACTIVE | Noted: 2019-08-16

## 2021-11-22 ENCOUNTER — HOSPITAL ENCOUNTER (EMERGENCY)
Facility: CLINIC | Age: 31
Discharge: HOME | End: 2021-11-22
Attending: EMERGENCY MEDICINE | Admitting: EMERGENCY MEDICINE
Payer: COMMERCIAL

## 2021-11-22 ENCOUNTER — APPOINTMENT (OUTPATIENT)
Dept: GENERAL RADIOLOGY | Facility: CLINIC | Age: 31
End: 2021-11-22
Attending: EMERGENCY MEDICINE
Payer: COMMERCIAL

## 2021-11-22 VITALS
DIASTOLIC BLOOD PRESSURE: 91 MMHG | RESPIRATION RATE: 18 BRPM | SYSTOLIC BLOOD PRESSURE: 151 MMHG | TEMPERATURE: 97.7 F | HEART RATE: 95 BPM | OXYGEN SATURATION: 100 %

## 2021-11-22 DIAGNOSIS — R07.89 ATYPICAL CHEST PAIN: ICD-10-CM

## 2021-11-22 LAB
ANION GAP SERPL CALCULATED.3IONS-SCNC: 4 MMOL/L (ref 3–14)
BASOPHILS # BLD AUTO: 0 10E3/UL (ref 0–0.2)
BASOPHILS NFR BLD AUTO: 1 %
BUN SERPL-MCNC: 16 MG/DL (ref 7–30)
CALCIUM SERPL-MCNC: 9.9 MG/DL (ref 8.5–10.1)
CHLORIDE BLD-SCNC: 106 MMOL/L (ref 94–109)
CO2 SERPL-SCNC: 28 MMOL/L (ref 20–32)
CREAT SERPL-MCNC: 0.78 MG/DL (ref 0.52–1.04)
D DIMER PPP FEU-MCNC: <0.27 UG/ML FEU (ref 0–0.5)
EOSINOPHIL # BLD AUTO: 0.3 10E3/UL (ref 0–0.7)
EOSINOPHIL NFR BLD AUTO: 4 %
ERYTHROCYTE [DISTWIDTH] IN BLOOD BY AUTOMATED COUNT: 11.7 % (ref 10–15)
GFR SERPL CREATININE-BSD FRML MDRD: >90 ML/MIN/1.73M2
GLUCOSE BLD-MCNC: 87 MG/DL (ref 70–99)
HCG SERPL QL: NEGATIVE
HCT VFR BLD AUTO: 44.1 % (ref 35–47)
HGB BLD-MCNC: 15.5 G/DL (ref 11.7–15.7)
IMM GRANULOCYTES # BLD: 0 10E3/UL
IMM GRANULOCYTES NFR BLD: 0 %
LYMPHOCYTES # BLD AUTO: 2.8 10E3/UL (ref 0.8–5.3)
LYMPHOCYTES NFR BLD AUTO: 33 %
MCH RBC QN AUTO: 30.3 PG (ref 26.5–33)
MCHC RBC AUTO-ENTMCNC: 35.1 G/DL (ref 31.5–36.5)
MCV RBC AUTO: 86 FL (ref 78–100)
MONOCYTES # BLD AUTO: 0.5 10E3/UL (ref 0–1.3)
MONOCYTES NFR BLD AUTO: 6 %
NEUTROPHILS # BLD AUTO: 4.8 10E3/UL (ref 1.6–8.3)
NEUTROPHILS NFR BLD AUTO: 56 %
NRBC # BLD AUTO: 0 10E3/UL
NRBC BLD AUTO-RTO: 0 /100
PLATELET # BLD AUTO: 231 10E3/UL (ref 150–450)
POTASSIUM BLD-SCNC: 3.5 MMOL/L (ref 3.4–5.3)
RBC # BLD AUTO: 5.11 10E6/UL (ref 3.8–5.2)
SODIUM SERPL-SCNC: 138 MMOL/L (ref 133–144)
T4 FREE SERPL-MCNC: 0.96 NG/DL (ref 0.76–1.46)
TROPONIN I SERPL-MCNC: <0.015 UG/L (ref 0–0.04)
TSH SERPL DL<=0.005 MIU/L-ACNC: 5.24 MU/L (ref 0.4–4)
WBC # BLD AUTO: 8.4 10E3/UL (ref 4–11)

## 2021-11-22 PROCEDURE — 71046 X-RAY EXAM CHEST 2 VIEWS: CPT

## 2021-11-22 PROCEDURE — 99285 EMERGENCY DEPT VISIT HI MDM: CPT | Mod: 25 | Performed by: EMERGENCY MEDICINE

## 2021-11-22 PROCEDURE — 84443 ASSAY THYROID STIM HORMONE: CPT | Performed by: EMERGENCY MEDICINE

## 2021-11-22 PROCEDURE — 85025 COMPLETE CBC W/AUTO DIFF WBC: CPT | Performed by: EMERGENCY MEDICINE

## 2021-11-22 PROCEDURE — 93005 ELECTROCARDIOGRAM TRACING: CPT | Performed by: EMERGENCY MEDICINE

## 2021-11-22 PROCEDURE — 84439 ASSAY OF FREE THYROXINE: CPT | Performed by: EMERGENCY MEDICINE

## 2021-11-22 PROCEDURE — 93010 ELECTROCARDIOGRAM REPORT: CPT | Performed by: EMERGENCY MEDICINE

## 2021-11-22 PROCEDURE — 85379 FIBRIN DEGRADATION QUANT: CPT | Performed by: EMERGENCY MEDICINE

## 2021-11-22 PROCEDURE — 36415 COLL VENOUS BLD VENIPUNCTURE: CPT | Performed by: EMERGENCY MEDICINE

## 2021-11-22 PROCEDURE — 84484 ASSAY OF TROPONIN QUANT: CPT | Performed by: EMERGENCY MEDICINE

## 2021-11-22 PROCEDURE — 84703 CHORIONIC GONADOTROPIN ASSAY: CPT | Performed by: EMERGENCY MEDICINE

## 2021-11-22 PROCEDURE — 80048 BASIC METABOLIC PNL TOTAL CA: CPT | Performed by: EMERGENCY MEDICINE

## 2021-11-22 ASSESSMENT — ENCOUNTER SYMPTOMS
COUGH: 0
ABDOMINAL PAIN: 0
HEADACHES: 0
NAUSEA: 0
DIFFICULTY URINATING: 0
SLEEP DISTURBANCE: 0
EYE REDNESS: 0
SHORTNESS OF BREATH: 0
DYSURIA: 0
SORE THROAT: 0
VOMITING: 0
BACK PAIN: 0
FEVER: 0
NECK PAIN: 0

## 2021-11-23 LAB
ATRIAL RATE - MUSE: 99 BPM
DIASTOLIC BLOOD PRESSURE - MUSE: NORMAL MMHG
INTERPRETATION ECG - MUSE: NORMAL
P AXIS - MUSE: 29 DEGREES
PR INTERVAL - MUSE: 156 MS
QRS DURATION - MUSE: 90 MS
QT - MUSE: 354 MS
QTC - MUSE: 454 MS
R AXIS - MUSE: 57 DEGREES
SYSTOLIC BLOOD PRESSURE - MUSE: NORMAL MMHG
T AXIS - MUSE: 27 DEGREES
VENTRICULAR RATE- MUSE: 99 BPM

## 2021-11-23 NOTE — ED PROVIDER NOTES
ED Provider Note  [facility]      History     Chief Complaint   Patient presents with     Chest Pain     Patient presents today due to intermittent burning/aching chest pain for the past 2 weeks. Currently reports 3/10. Patient reports pain being more consistent today.     HPI  Carla Merlos is a 31 year old female who presents to the emergency department for evaluation of chest pain.  Patient states that she has had a pressure sensation in her chest intermittently for the past 2 weeks.  The patient states that it has been intermittent until today when it was essentially constant for several hours.  Patient denies any associated dyspnea.  No diaphoresis.  No nausea or vomiting.  She reports her symptoms occur more when she is under stress.  She states that she had been recently started on Adderall and was doing fine on the starting dose.  Her symptoms began when her dose was increased.  Her dose was subsequently decreased and subsequently discontinued but her symptoms persist.  Patient denies any cough or fever.  No leg pain or swelling.  No abdominal pain.  No nausea or vomiting.  She states her symptoms are improved when she lies down and rests.  Today, she feels her symptoms improved when her mom came to stay with her here at the emergency department.    Past Medical History  Past Medical History:   Diagnosis Date     ADHD      Depressive disorder      NO ACTIVE PROBLEMS      Past Surgical History:   Procedure Laterality Date     BIOPSY      two benign moles removed     none       albuterol (PROAIR HFA/PROVENTIL HFA/VENTOLIN HFA) 108 (90 BASE) MCG/ACT Inhaler  Cholecalciferol (VITAMIN D) 2000 UNIT CAPS  citalopram (CELEXA) 20 MG tablet      Allergies   Allergen Reactions     Seasonal Allergies Cough     Pollen and trees     Amoxicillin Rash     Family History  Family History   Problem Relation Age of Onset     Breast Cancer Maternal Grandmother      VIVIEN Maternal Grandmother       Cardiovascular Maternal Grandmother      Depression Maternal Grandmother      Diabetes Maternal Grandfather      Hypertension Mother      Cardiovascular Paternal Grandmother      Depression Father      Diabetes Paternal Grandfather      Diabetes Other      Depression Sister      Depression Sister      Unknown/Adopted No family hx of      Asthma No family hx of      Cerebrovascular Disease No family hx of      Alcohol/Drug No family hx of      Prostate Cancer No family hx of      Cancer - colorectal No family hx of      Thyroid Disease No family hx of      Glaucoma No family hx of      Macular Degeneration No family hx of      Social History   Social History     Tobacco Use     Smoking status: Never Smoker     Smokeless tobacco: Never Used     Tobacco comment:  smokes   Substance Use Topics     Alcohol use: No     Drug use: No      Past medical history, past surgical history, medications, allergies, family history, and social history were reviewed with the patient. No additional pertinent items.       Review of Systems   Constitutional: Negative for fever.   HENT: Negative for sore throat.    Eyes: Negative for redness.   Respiratory: Negative for cough and shortness of breath.    Cardiovascular: Positive for chest pain.   Gastrointestinal: Negative for abdominal pain, nausea and vomiting.   Genitourinary: Negative for difficulty urinating and dysuria.   Musculoskeletal: Negative for back pain and neck pain.   Skin: Negative for rash.   Neurological: Negative for headaches.   Psychiatric/Behavioral: Negative for sleep disturbance.   All other systems reviewed and are negative.    A complete review of systems was performed with pertinent positives and negatives noted in the HPI, and all other systems negative.    Physical Exam   BP: (!) 151/91  Pulse: 95  Temp: 97.7  F (36.5  C)  Resp: 18  SpO2: 100 %  Physical Exam  Vitals and nursing note reviewed.   Constitutional:       General: She is not in acute  distress.     Appearance: She is well-developed. She is not diaphoretic.   HENT:      Head: Atraumatic.      Mouth/Throat:      Pharynx: No oropharyngeal exudate.   Eyes:      General: No scleral icterus.     Pupils: Pupils are equal, round, and reactive to light.   Cardiovascular:      Rate and Rhythm: Normal rate and regular rhythm.      Heart sounds: Normal heart sounds.   Pulmonary:      Effort: No respiratory distress.      Breath sounds: Normal breath sounds.   Abdominal:      General: Bowel sounds are normal.      Palpations: Abdomen is soft.      Tenderness: There is no abdominal tenderness.   Musculoskeletal:         General: No tenderness.   Skin:     General: Skin is warm and dry.      Findings: No rash.   Neurological:      General: No focal deficit present.      Mental Status: She is alert.         ED Course      Procedures            EKG Interpretation:      Interpreted by SYDNIE RECINOS MD, MD  Time reviewed: 1700  Symptoms at time of EKG: chest pain   Rhythm: normal sinus   Rate: normal  Axis: normal  Ectopy: none  Conduction: normal  ST Segments/ T Waves: No ST-T wave changes  Q Waves: none  Comparison to prior: No old EKG available    Clinical Impression: normal EKG    Results for orders placed or performed during the hospital encounter of 11/22/21   XR Chest 2 Views     Status: None    Narrative    EXAM: XR CHEST 2 VW  LOCATION: [facility]  DATE/TIME: 11/22/2021 8:45 PM    INDICATION: chest pain  COMPARISON: None.      Impression    IMPRESSION: Negative chest.   Basic metabolic panel     Status: Normal   Result Value Ref Range    Sodium 138 133 - 144 mmol/L    Potassium 3.5 3.4 - 5.3 mmol/L    Chloride 106 94 - 109 mmol/L    Carbon Dioxide (CO2) 28 20 - 32 mmol/L    Anion Gap 4 3 - 14 mmol/L    Urea Nitrogen 16 7 - 30 mg/dL    Creatinine 0.78 0.52 - 1.04 mg/dL    Calcium 9.9 8.5 - 10.1 mg/dL    Glucose 87 70 - 99 mg/dL    GFR Estimate >90 >60  mL/min/1.73m2   D dimer quantitative     Status: Normal   Result Value Ref Range    D-Dimer Quantitative <0.27 0.00 - 0.50 ug/mL FEU    Narrative    This D-dimer assay is intended for use in conjunction with a clinical pretest probability assessment model to exclude pulmonary embolism (PE) and deep venous thrombosis (DVT) in outpatients suspected of PE or DVT. The cut-off value is 0.50 ug/mL FEU.   Troponin I     Status: Normal   Result Value Ref Range    Troponin I <0.015 0.000 - 0.045 ug/L   HCG qualitative Blood     Status: Normal   Result Value Ref Range    hCG Serum Qualitative Negative Negative   TSH with free T4 reflex     Status: Abnormal   Result Value Ref Range    TSH 5.24 (H) 0.40 - 4.00 mU/L   CBC with platelets and differential     Status: None   Result Value Ref Range    WBC Count 8.4 4.0 - 11.0 10e3/uL    RBC Count 5.11 3.80 - 5.20 10e6/uL    Hemoglobin 15.5 11.7 - 15.7 g/dL    Hematocrit 44.1 35.0 - 47.0 %    MCV 86 78 - 100 fL    MCH 30.3 26.5 - 33.0 pg    MCHC 35.1 31.5 - 36.5 g/dL    RDW 11.7 10.0 - 15.0 %    Platelet Count 231 150 - 450 10e3/uL    % Neutrophils 56 %    % Lymphocytes 33 %    % Monocytes 6 %    % Eosinophils 4 %    % Basophils 1 %    % Immature Granulocytes 0 %    NRBCs per 100 WBC 0 <1 /100    Absolute Neutrophils 4.8 1.6 - 8.3 10e3/uL    Absolute Lymphocytes 2.8 0.8 - 5.3 10e3/uL    Absolute Monocytes 0.5 0.0 - 1.3 10e3/uL    Absolute Eosinophils 0.3 0.0 - 0.7 10e3/uL    Absolute Basophils 0.0 0.0 - 0.2 10e3/uL    Absolute Immature Granulocytes 0.0 <=0.0 10e3/uL    Absolute NRBCs 0.0 10e3/uL   T4 free     Status: Normal   Result Value Ref Range    Free T4 0.96 0.76 - 1.46 ng/dL   EKG 12 lead     Status: None   Result Value Ref Range    Systolic Blood Pressure  mmHg    Diastolic Blood Pressure  mmHg    Ventricular Rate 99 BPM    Atrial Rate 99 BPM    OR Interval 156 ms    QRS Duration 90 ms     ms    QTc 454 ms    P Axis 29 degrees    R AXIS 57 degrees    T Axis 27 degrees     Interpretation ECG       Sinus rhythm  Normal ECG  Unconfirmed report - interpretation of this ECG is computer generated - see medical record for final interpretation  Confirmed by - EMERGENCY ROOM, PHYSICIAN ([id number]),  HASMUKH MCKEON ([id number]) on 11/23/2021 10:47:40 AM     CBC with platelets differential     Status: None    Narrative    The following orders were created for panel order CBC with platelets differential.  Procedure                               Abnormality         Status                     ---------                               -----------         ------                     CBC with platelets and d...[[id number]]                      Final result                 Please view results for these tests on the individual orders.             Results for orders placed or performed during the hospital encounter of 11/22/21   XR Chest 2 Views     Status: None    Narrative    EXAM: XR CHEST 2 VW  LOCATION: [facility]  DATE/TIME: 11/22/2021 8:45 PM    INDICATION: chest pain  COMPARISON: None.      Impression    IMPRESSION: Negative chest.   Basic metabolic panel     Status: Normal   Result Value Ref Range    Sodium 138 133 - 144 mmol/L    Potassium 3.5 3.4 - 5.3 mmol/L    Chloride 106 94 - 109 mmol/L    Carbon Dioxide (CO2) 28 20 - 32 mmol/L    Anion Gap 4 3 - 14 mmol/L    Urea Nitrogen 16 7 - 30 mg/dL    Creatinine 0.78 0.52 - 1.04 mg/dL    Calcium 9.9 8.5 - 10.1 mg/dL    Glucose 87 70 - 99 mg/dL    GFR Estimate >90 >60 mL/min/1.73m2   D dimer quantitative     Status: Normal   Result Value Ref Range    D-Dimer Quantitative <0.27 0.00 - 0.50 ug/mL FEU    Narrative    This D-dimer assay is intended for use in conjunction with a clinical pretest probability assessment model to exclude pulmonary embolism (PE) and deep venous thrombosis (DVT) in outpatients suspected of PE or DVT. The cut-off value is 0.50 ug/mL FEU.   Troponin I     Status: Normal   Result Value  Ref Range    Troponin I <0.015 0.000 - 0.045 ug/L   HCG qualitative Blood     Status: Normal   Result Value Ref Range    hCG Serum Qualitative Negative Negative   TSH with free T4 reflex     Status: Abnormal   Result Value Ref Range    TSH 5.24 (H) 0.40 - 4.00 mU/L   CBC with platelets and differential     Status: None   Result Value Ref Range    WBC Count 8.4 4.0 - 11.0 10e3/uL    RBC Count 5.11 3.80 - 5.20 10e6/uL    Hemoglobin 15.5 11.7 - 15.7 g/dL    Hematocrit 44.1 35.0 - 47.0 %    MCV 86 78 - 100 fL    MCH 30.3 26.5 - 33.0 pg    MCHC 35.1 31.5 - 36.5 g/dL    RDW 11.7 10.0 - 15.0 %    Platelet Count 231 150 - 450 10e3/uL    % Neutrophils 56 %    % Lymphocytes 33 %    % Monocytes 6 %    % Eosinophils 4 %    % Basophils 1 %    % Immature Granulocytes 0 %    NRBCs per 100 WBC 0 <1 /100    Absolute Neutrophils 4.8 1.6 - 8.3 10e3/uL    Absolute Lymphocytes 2.8 0.8 - 5.3 10e3/uL    Absolute Monocytes 0.5 0.0 - 1.3 10e3/uL    Absolute Eosinophils 0.3 0.0 - 0.7 10e3/uL    Absolute Basophils 0.0 0.0 - 0.2 10e3/uL    Absolute Immature Granulocytes 0.0 <=0.0 10e3/uL    Absolute NRBCs 0.0 10e3/uL   T4 free     Status: Normal   Result Value Ref Range    Free T4 0.96 0.76 - 1.46 ng/dL   EKG 12 lead     Status: None   Result Value Ref Range    Systolic Blood Pressure  mmHg    Diastolic Blood Pressure  mmHg    Ventricular Rate 99 BPM    Atrial Rate 99 BPM    CO Interval 156 ms    QRS Duration 90 ms     ms    QTc 454 ms    P Axis 29 degrees    R AXIS 57 degrees    T Axis 27 degrees    Interpretation ECG       Sinus rhythm  Normal ECG  Unconfirmed report - interpretation of this ECG is computer generated - see medical record for final interpretation  Confirmed by - EMERGENCY ROOM, PHYSICIAN ([id number]),  HASMUKH MCKEON ([id number]) on 11/23/2021 10:47:40 AM     CBC with platelets differential     Status: None    Narrative    The following orders were created for panel order CBC with platelets differential.  Procedure                                Abnormality         Status                     ---------                               -----------         ------                     CBC with platelets and d...[[id number]]                      Final result                 Please view results for these tests on the individual orders.     Medications - No data to display     Assessments & Plan (with Medical Decision Making)   31 year old female to the emergency department for evaluation of chest pain.  She has been having intermittent central chest pressure for the last 2 weeks.  She has been taken off of Adderall as a result without resolution of her symptoms.  Per her report, they tend to occur when she has having stress and anxiety.  Differential diagnosis includes ACS, pneumonia, pneumothorax, pulmonary embolism, GERD, anxiety/panic attack.  Will perform EKG, labs, and chest radiograph.    The patient has a normal EKG and troponin.  She has had essentially consistent symptoms for several hours today so do not suspect ACS.  Delta troponin testing discussed with the patient but felt the benefit would be unlikely so patient elected to not pursue delta troponin testing.  Patient's chest radiograph does not reveal any infiltrate or pneumothorax.  Her D-dimer is negative so do not suspect pulmonary embolism in this low risk patient.  Possible etiologies continue to be GERD and anxiety.  She seems to describe the symptoms occurring mostly in stressful situations so anxiety is the leading cause although GERD is still possible.  The patient can initiate a trial of Pepcid if she would like.  Primary care follow-up recommended.  Did discuss outpatient stress echo if she is having persistent symptoms at follow-up and is still concerned about cardiac etiology.  She is appropriate for outpatient follow-up with a low heart score of 0.    I have reviewed the nursing notes. I have reviewed the findings, diagnosis, plan and need for follow up with the  patient.    Discharge Medication List as of 11/22/2021 10:59 PM          Final diagnoses:   Atypical chest pain     Chart documentation was completed with Dragon voice-recognition software. Even though reviewed, this chart may still contain some grammatical, spelling, and word errors.     --  Adam Swanson Md  [facility] EMERGENCY DEPARTMENT  11/22/2021     Adam Swanson MD  11/23/21 1142

## 2021-12-21 ENCOUNTER — HOSPITAL ENCOUNTER (OUTPATIENT)
Dept: CARDIOLOGY | Facility: HOSPITAL | Age: 31
Discharge: HOME | End: 2021-12-21
Attending: FAMILY MEDICINE | Admitting: FAMILY MEDICINE
Payer: COMMERCIAL

## 2021-12-21 DIAGNOSIS — R07.9 CHEST PAIN AT REST: ICD-10-CM

## 2021-12-21 PROCEDURE — 93350 STRESS TTE ONLY: CPT | Mod: 26 | Performed by: INTERNAL MEDICINE

## 2021-12-21 PROCEDURE — 93018 CV STRESS TEST I&R ONLY: CPT | Performed by: INTERNAL MEDICINE

## 2021-12-21 PROCEDURE — 93321 DOPPLER ECHO F-UP/LMTD STD: CPT | Mod: 26 | Performed by: INTERNAL MEDICINE

## 2021-12-21 PROCEDURE — 93350 STRESS TTE ONLY: CPT | Mod: TC

## 2021-12-21 PROCEDURE — 93016 CV STRESS TEST SUPVJ ONLY: CPT | Performed by: INTERNAL MEDICINE

## 2021-12-21 PROCEDURE — 93325 DOPPLER ECHO COLOR FLOW MAPG: CPT | Mod: 26 | Performed by: INTERNAL MEDICINE

## 2021-12-21 PROCEDURE — 93017 CV STRESS TEST TRACING ONLY: CPT

## 2022-01-22 ENCOUNTER — HEALTH MAINTENANCE LETTER (OUTPATIENT)
Age: 32
End: 2022-01-22

## 2022-09-03 ENCOUNTER — HEALTH MAINTENANCE LETTER (OUTPATIENT)
Age: 32
End: 2022-09-03

## 2023-04-23 ENCOUNTER — HEALTH MAINTENANCE LETTER (OUTPATIENT)
Age: 33
End: 2023-04-23

## 2024-04-27 ENCOUNTER — HEALTH MAINTENANCE LETTER (OUTPATIENT)
Age: 34
End: 2024-04-27